# Patient Record
Sex: FEMALE | Race: WHITE | NOT HISPANIC OR LATINO | Employment: FULL TIME | URBAN - NONMETROPOLITAN AREA
[De-identification: names, ages, dates, MRNs, and addresses within clinical notes are randomized per-mention and may not be internally consistent; named-entity substitution may affect disease eponyms.]

---

## 2017-06-02 ENCOUNTER — HOSPITAL ENCOUNTER (EMERGENCY)
Facility: HOSPITAL | Age: 37
Discharge: HOME | End: 2017-06-02
Attending: PHYSICIAN ASSISTANT | Admitting: PHYSICIAN ASSISTANT
Payer: COMMERCIAL

## 2017-06-02 VITALS
HEART RATE: 93 BPM | OXYGEN SATURATION: 99 % | TEMPERATURE: 98.2 F | RESPIRATION RATE: 16 BRPM | DIASTOLIC BLOOD PRESSURE: 58 MMHG | SYSTOLIC BLOOD PRESSURE: 125 MMHG

## 2017-06-02 DIAGNOSIS — R22.0 FACIAL SWELLING: ICD-10-CM
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
## 2017-06-02 PROCEDURE — 99214 OFFICE O/P EST MOD 30 MIN: CPT | Mod: 25

## 2017-06-02 PROCEDURE — 99213 OFFICE O/P EST LOW 20 MIN: CPT | Performed by: PHYSICIAN ASSISTANT

## 2017-06-02 PROCEDURE — 25000132 ZZH RX MED GY IP 250 OP 250 PS 637: Performed by: PHYSICIAN ASSISTANT

## 2017-06-02 PROCEDURE — 25000128 H RX IP 250 OP 636: Performed by: PHYSICIAN ASSISTANT

## 2017-06-02 PROCEDURE — 96372 THER/PROPH/DIAG INJ SC/IM: CPT

## 2017-06-02 PROCEDURE — 96374 THER/PROPH/DIAG INJ IV PUSH: CPT

## 2017-06-02 RX ORDER — METHYLPREDNISOLONE SODIUM SUCCINATE 125 MG/2ML
125 INJECTION, POWDER, LYOPHILIZED, FOR SOLUTION INTRAMUSCULAR; INTRAVENOUS ONCE
Status: COMPLETED | OUTPATIENT
Start: 2017-06-02 | End: 2017-06-02

## 2017-06-02 RX ORDER — CETIRIZINE HYDROCHLORIDE 10 MG/1
10 TABLET ORAL ONCE
Status: COMPLETED | OUTPATIENT
Start: 2017-06-02 | End: 2017-06-02

## 2017-06-02 RX ADMIN — RANITIDINE HYDROCHLORIDE 300 MG: 150 TABLET, FILM COATED ORAL at 15:29

## 2017-06-02 RX ADMIN — METHYLPREDNISOLONE SODIUM SUCCINATE 125 MG: 125 INJECTION, POWDER, FOR SOLUTION INTRAMUSCULAR; INTRAVENOUS at 15:36

## 2017-06-02 RX ADMIN — CETIRIZINE HYDROCHLORIDE 10 MG: 10 TABLET, FILM COATED ORAL at 15:29

## 2017-06-02 ASSESSMENT — ENCOUNTER SYMPTOMS
HEADACHES: 0
RESPIRATORY NEGATIVE: 1
VOMITING: 0
WHEEZING: 0
FEVER: 0
NAUSEA: 0
TROUBLE SWALLOWING: 0
EYES NEGATIVE: 1
STRIDOR: 0
CHOKING: 0
NUMBNESS: 1
CONSTITUTIONAL NEGATIVE: 1
CHILLS: 0
CARDIOVASCULAR NEGATIVE: 1
COUGH: 0
SHORTNESS OF BREATH: 0
MUSCULOSKELETAL NEGATIVE: 1
LIGHT-HEADEDNESS: 0
SORE THROAT: 0
FACIAL SWELLING: 1

## 2017-06-02 NOTE — ED NOTES
Ambulated into UC. With facial pain. Rating 0 out of 10. Pt. Was bit by what she believes was a deer fly on the forehead at 10:30 this morning.  Face was swelling and red in character. Benadryl  tabs 25mg administered at 1345. States medication was not helpful and feels her face has increased swelling.

## 2017-06-02 NOTE — DISCHARGE INSTRUCTIONS
- 20mg zyrtec (double dose of zyrtec/allegra/claritin) 2x daily for 2-3 days.   - If none of the above and benadryl doesn't make you too tired, you can take 50 mg every 4-6 hrs for 2 days.     * Recheck here after 48-72 hrs with any concern for infection. Back here sooner with any facial swelling of the lips/tongue/uvula - 911 with trouble breathing.

## 2017-06-02 NOTE — ED PROVIDER NOTES
History     Chief Complaint   Patient presents with     Facial Swelling     The history is provided by the patient. No  was used.     Riri Oro is a 37 year old female who presents with facial swelling after horsefly? Bite earlier today. She has taken 50 mg of benadryl about 3 hrs ago and that has helped with itching. NO lip, tongue or throat swelling. She reports her throat DOES feel scratchy. NO shortness of breath.     I have reviewed the Medications, Allergies, Past Medical and Surgical History, and Social History in the Epic system.    Review of Systems   Constitutional: Negative.  Negative for chills and fever.   HENT: Positive for facial swelling. Negative for sore throat and trouble swallowing.    Eyes: Negative.    Respiratory: Negative.  Negative for cough, choking, shortness of breath, wheezing and stridor.    Cardiovascular: Negative.    Gastrointestinal: Negative for nausea and vomiting.   Musculoskeletal: Negative.    Skin: Positive for rash.   Neurological: Positive for numbness (tingling around site). Negative for syncope, light-headedness and headaches.       Physical Exam   BP: 125/58  Pulse: 93  Temp: 98.2  F (36.8  C)  Resp: 16  SpO2: 99 %  Physical Exam   Constitutional: She is oriented to person, place, and time. She appears well-developed and well-nourished. No distress.   HENT:   Head:       NO angioedema.   Cardiovascular: Normal rate.    Pulmonary/Chest: Effort normal.   Neurological: She is alert and oriented to person, place, and time.   Skin: Skin is warm and dry.   Psychiatric: She has a normal mood and affect.   Nursing note and vitals reviewed.      ED Course     ED Course     Procedures  Medications   methylPREDNISolone sodium succinate (solu-MEDROL) injection 125 mg (125 mg Intravenous Given 6/2/17 1536)   ranitidine (ZANTAC) tablet 300 mg (300 mg Oral Given 6/2/17 1529)   cetirizine (zyrTEC) tablet 10 mg (10 mg Oral Given 6/2/17 1529)   Patient  tolerated. Patient observed for 20 minutes.       Assessments & Plan (with Medical Decision Making)     I have reviewed the nursing notes.    I have reviewed the findings, diagnosis, plan and need for follow up with the patient.    There are no discharge medications for this patient.      Final diagnoses:   Insect bite, initial encounter   Facial swelling   Pt treated as above. Will take zyrtec BID for 3-5 days. Back here with tongue/lip swelling, 911 with trouble breathing. Patient verbally educated and given appropriate education sheets for each of the diagnoses and has no questions.    Venkat Santos PA-C   6/2/2017   6:08 PM    6/2/2017   [facility]     Venkat Satnos PA  06/02/17 1800

## 2017-06-02 NOTE — ED AVS SNAPSHOT
[facility]    [address] [address]    Phone:  [phone]                                       Riri Oro   MRN: [id number]    Department:  [facility]   Date of Visit:  6/2/2017           After Visit Summary Signature Page     I have received my discharge instructions, and my questions have been answered. I have discussed any challenges I see with this plan with the nurse or doctor.    ..........................................................................................................................................  Patient/Patient Representative Signature      ..........................................................................................................................................  Patient Representative Print Name and Relationship to Patient    ..................................................               ................................................  Date                                            Time    ..........................................................................................................................................  Reviewed by Signature/Title    ...................................................              ..............................................  Date                                                            Time

## 2017-06-02 NOTE — ED AVS SNAPSHOT
" [facility]    [address] [place] [address]    Phone:  [phone]                                       Riri Oro   MRN: [id number]    Department:  [facility]   Date of Visit:  6/2/2017           Patient Information     Date Of Birth          1980        Your diagnoses for this visit were:     Insect bite, initial encounter     Facial swelling        You were seen by Venkat Santos PA.        Discharge Instructions       - 20mg zyrtec (double dose of zyrtec/allegra/claritin) 2x daily for 2-3 days.   - If none of the above and benadryl doesn't make you too tired, you can take 50 mg every 4-6 hrs for 2 days.     * Recheck here after 48-72 hrs with any concern for infection. Back here sooner with any facial swelling of the lips/tongue/uvula - 911 with trouble breathing.     Discharge References/Attachments     INSECT BITE (ENGLISH)    ANGIOEDEMA (ENGLISH)         Review of your medicines      Notice     You have not been prescribed any medications.            Orders Needing Specimen Collection     None      Pending Results     No orders found from 5/31/2017 to 6/3/2017.            Pending Culture Results     No orders found from 5/31/2017 to 6/3/2017.            Thank you for choosing [facility]       Thank you for choosing [facility] for your care. Our goal is always to provide you with excellent care. Hearing back from our patients is one way we can continue to improve our services. Please take a few minutes to complete the written survey that you may receive in the mail after you visit with us. Thank you!        [employer] Information     [employer] lets you send messages to your doctor, view your test results, renew your prescriptions, schedule appointments and more. To sign up, go to [email or web address] . Click on \"Log in\" on the left side of the screen, which will take you to the Welcome page. Then click on \"Sign up Now\" on the right side of the page.     You " will be asked to enter the access code listed below, as well as some personal information. Please follow the directions to create your username and password.     Your access code is: [email or web address]  Expires: 2017  3:13 PM     Your access code will  in 90 days. If you need help or a new code, please call your [facility] clinic or [phone].        Care EveryWhere ID     This is your Care EveryWhere ID. This could be used by other organizations to access your [facility] records  [id number]        After Visit Summary       This is your record. Keep this with you and show to your community pharmacist(s) and doctor(s) at your next visit.

## 2017-11-26 ENCOUNTER — HEALTH MAINTENANCE LETTER (OUTPATIENT)
Age: 37
End: 2017-11-26

## 2018-01-03 ENCOUNTER — TELEPHONE (OUTPATIENT)
Dept: FAMILY MEDICINE | Facility: OTHER | Age: 38
End: 2018-01-03

## 2018-01-03 NOTE — TELEPHONE ENCOUNTER
Patient called wanting to be seen today but at this time we have no availability. I instructed patient we can not evaluate over the phone, instructed she can go to ER/Urgent care to be evaluated or can call in the morning to see if we have any same day slots

## 2018-02-06 ENCOUNTER — TRANSFERRED RECORDS (OUTPATIENT)
Dept: HEALTH INFORMATION MANAGEMENT | Facility: CLINIC | Age: 38
End: 2018-02-06

## 2018-02-06 LAB
HPV ABSTRACT: NORMAL
PAP-ABSTRACT: NORMAL

## 2019-05-07 ENCOUNTER — HOSPITAL ENCOUNTER (EMERGENCY)
Facility: HOSPITAL | Age: 39
Discharge: HOME | End: 2019-05-07
Attending: NURSE PRACTITIONER | Admitting: NURSE PRACTITIONER
Payer: COMMERCIAL

## 2019-05-07 ENCOUNTER — APPOINTMENT (OUTPATIENT)
Dept: GENERAL RADIOLOGY | Facility: HOSPITAL | Age: 39
End: 2019-05-07
Attending: NURSE PRACTITIONER
Payer: COMMERCIAL

## 2019-05-07 VITALS
OXYGEN SATURATION: 100 % | DIASTOLIC BLOOD PRESSURE: 68 MMHG | TEMPERATURE: 98.7 F | BODY MASS INDEX: 33.66 KG/M2 | SYSTOLIC BLOOD PRESSURE: 125 MMHG | HEIGHT: 63 IN | HEART RATE: 92 BPM | WEIGHT: 190 LBS | RESPIRATION RATE: 20 BRPM

## 2019-05-07 DIAGNOSIS — R05.9 COUGH: ICD-10-CM

## 2019-05-07 DIAGNOSIS — K64.4 EXTERNAL HEMORRHOIDS: ICD-10-CM

## 2019-05-07 DIAGNOSIS — D64.9 ANEMIA, UNSPECIFIED TYPE: ICD-10-CM

## 2019-05-07 LAB
ANION GAP SERPL CALCULATED.3IONS-SCNC: 4 MMOL/L (ref 3–14)
BASOPHILS # BLD AUTO: 0 10E9/L (ref 0–0.2)
BASOPHILS NFR BLD AUTO: 0.4 %
BUN SERPL-MCNC: 12 MG/DL (ref 7–30)
CALCIUM SERPL-MCNC: 8.5 MG/DL (ref 8.5–10.1)
CHLORIDE SERPL-SCNC: 108 MMOL/L (ref 94–109)
CO2 SERPL-SCNC: 27 MMOL/L (ref 20–32)
CREAT SERPL-MCNC: 0.82 MG/DL (ref 0.52–1.04)
DIFFERENTIAL METHOD BLD: ABNORMAL
EOSINOPHIL # BLD AUTO: 0.2 10E9/L (ref 0–0.7)
EOSINOPHIL NFR BLD AUTO: 1.6 %
ERYTHROCYTE [DISTWIDTH] IN BLOOD BY AUTOMATED COUNT: 15.5 % (ref 10–15)
GFR SERPL CREATININE-BSD FRML MDRD: 89 ML/MIN/{1.73_M2}
GLUCOSE SERPL-MCNC: 92 MG/DL (ref 70–99)
HCT VFR BLD AUTO: 26.1 % (ref 35–47)
HGB BLD-MCNC: 7.3 G/DL (ref 11.7–15.7)
HGB BLD-MCNC: 7.5 G/DL (ref 11.7–15.7)
IMM GRANULOCYTES # BLD: 0.1 10E9/L (ref 0–0.4)
IMM GRANULOCYTES NFR BLD: 0.9 %
LYMPHOCYTES # BLD AUTO: 1.8 10E9/L (ref 0.8–5.3)
LYMPHOCYTES NFR BLD AUTO: 18.5 %
MCH RBC QN AUTO: 20.7 PG (ref 26.5–33)
MCHC RBC AUTO-ENTMCNC: 28.7 G/DL (ref 31.5–36.5)
MCV RBC AUTO: 72 FL (ref 78–100)
MONOCYTES # BLD AUTO: 0.6 10E9/L (ref 0–1.3)
MONOCYTES NFR BLD AUTO: 6.7 %
NEUTROPHILS # BLD AUTO: 6.9 10E9/L (ref 1.6–8.3)
NEUTROPHILS NFR BLD AUTO: 71.9 %
NRBC # BLD AUTO: 0 10*3/UL
NRBC BLD AUTO-RTO: 0 /100
PLATELET # BLD AUTO: 419 10E9/L (ref 150–450)
POTASSIUM SERPL-SCNC: 3.7 MMOL/L (ref 3.4–5.3)
RBC # BLD AUTO: 3.63 10E12/L (ref 3.8–5.2)
SODIUM SERPL-SCNC: 139 MMOL/L (ref 133–144)
TROPONIN I SERPL-MCNC: <0.015 UG/L (ref 0–0.04)
WBC # BLD AUTO: 9.6 10E9/L (ref 4–11)

## 2019-05-07 PROCEDURE — G0463 HOSPITAL OUTPT CLINIC VISIT: HCPCS | Mod: 25

## 2019-05-07 PROCEDURE — 71046 X-RAY EXAM CHEST 2 VIEWS: CPT | Mod: TC

## 2019-05-07 PROCEDURE — 93010 ELECTROCARDIOGRAM REPORT: CPT | Performed by: INTERNAL MEDICINE

## 2019-05-07 PROCEDURE — 93005 ELECTROCARDIOGRAM TRACING: CPT

## 2019-05-07 PROCEDURE — 99215 OFFICE O/P EST HI 40 MIN: CPT | Mod: Z6 | Performed by: NURSE PRACTITIONER

## 2019-05-07 PROCEDURE — 36415 COLL VENOUS BLD VENIPUNCTURE: CPT | Performed by: NURSE PRACTITIONER

## 2019-05-07 PROCEDURE — 85018 HEMOGLOBIN: CPT | Performed by: NURSE PRACTITIONER

## 2019-05-07 PROCEDURE — 85025 COMPLETE CBC W/AUTO DIFF WBC: CPT | Performed by: NURSE PRACTITIONER

## 2019-05-07 PROCEDURE — 84484 ASSAY OF TROPONIN QUANT: CPT | Performed by: NURSE PRACTITIONER

## 2019-05-07 PROCEDURE — 80048 BASIC METABOLIC PNL TOTAL CA: CPT | Performed by: NURSE PRACTITIONER

## 2019-05-07 RX ORDER — FERROUS SULFATE 325(65) MG
325 TABLET ORAL 3 TIMES DAILY
Qty: 90 TABLET | Refills: 0 | Status: SHIPPED | OUTPATIENT
Start: 2019-05-07 | End: 2021-05-24

## 2019-05-07 ASSESSMENT — ENCOUNTER SYMPTOMS
RECTAL PAIN: 1
CHEST TIGHTNESS: 1
ANAL BLEEDING: 1
VOMITING: 0
COUGH: 1
ABDOMINAL PAIN: 0

## 2019-05-07 ASSESSMENT — MIFFLIN-ST. JEOR: SCORE: 1495.96

## 2019-05-07 NOTE — ED PROVIDER NOTES
History     Chief Complaint   Patient presents with     Cough     X 2 months     Nasal Congestion     X 2 days     HPI  Riri Oro is a 39 year old female who presents today with a CC of cough x 2 months, nasal congestion x 3 days.  No measured fevers.  She has been using cough drops off and on over the past 2 months.  Increased shortness of breath with going up and down stairs, fatigue and chest pressure x 2 months.  She denies chest pain.  She notes a history of intermittent dizziness while walking off and on x 2 years.  She states she had a work up in the past with EKG, lab work, they didn't find anything.  Symptoms seemed to resolve for some time, now it they seem to have increased again.      Allergies:  Allergies   Allergen Reactions     Bee Venom Anaphylaxis     Amoxicillin Hives       Problem List:    Patient Active Problem List    Diagnosis Date Noted     Spondylolisthesis of lumbar region      Priority: Medium     Grade 2/4       Obesity      Priority: Medium     Tobacco use disorder 2013     Priority: Medium        Past Medical History:    Past Medical History:   Diagnosis Date     Obesity      Spondylolisthesis of lumbar region      Tobacco use disorder 2013       Past Surgical History:    Past Surgical History:   Procedure Laterality Date     GYN SURGERY           HC HYSTEROS W PERMANENT FALLOPAIN IMPLANT         Family History:    Family History   Problem Relation Age of Onset     Heart Disease Father 53        MI and bypass surgery     Diabetes Father      Hypertension Father        Social History:  Marital Status:  Legally  [3]  Social History     Tobacco Use     Smoking status: Current Every Day Smoker     Packs/day: 0.50     Smokeless tobacco: Never Used   Substance Use Topics     Alcohol use: No     Drug use: No        Medications:      ferrous sulfate (FEROSUL) 325 (65 Fe) MG tablet         Review of Systems   Constitutional: Positive for fatigue. Negative for  "appetite change, chills and fever.   HENT: Positive for congestion. Negative for ear pain, postnasal drip, sinus pressure, sinus pain and sore throat.    Respiratory: Positive for cough, chest tightness and shortness of breath (with walking up and down stairs).    Cardiovascular: Positive for leg swelling (intermittent, dependent at the end of the evening). Negative for chest pain.   Gastrointestinal: Positive for anal bleeding (external hemorrhoids) and rectal pain. Negative for abdominal distention, abdominal pain and vomiting.        Denies black or tarry stools, reports times with duc blood in toilet from external hemorrhoids after stooling   Genitourinary: Negative for dysuria, hematuria, menstrual problem, pelvic pain and urgency.   Musculoskeletal: Negative for arthralgias and myalgias.   Skin: Negative for color change, pallor and rash.   Neurological: Positive for light-headedness (as per HPI). Negative for syncope.   Psychiatric/Behavioral: The patient is nervous/anxious (at times).        Physical Exam   BP: 125/68  Pulse: 92  Temp: 98.7  F (37.1  C)  Resp: 20  Height: 160 cm (5' 3\")  Weight: 86.2 kg (190 lb)  SpO2: 100 %      Physical Exam   Constitutional: She appears well-developed. She is cooperative. She does not appear ill.   HENT:   Head: Normocephalic and atraumatic.   Right Ear: Tympanic membrane, external ear and ear canal normal.   Left Ear: Tympanic membrane, external ear and ear canal normal.   Nose: Nose normal.   Mouth/Throat: Uvula is midline and oropharynx is clear and moist.   Neck: Normal range of motion. Neck supple.   Cardiovascular: Normal rate and regular rhythm.   Pulmonary/Chest: Effort normal and breath sounds normal.   Abdominal: Soft. Bowel sounds are normal. She exhibits no distension and no mass. There is no tenderness. There is no guarding.   Genitourinary: Rectal exam shows external hemorrhoid (several large, no active bleeding, no sign of thrombosis).   Musculoskeletal: " Normal range of motion.   Neurological: She is alert.   Skin: Skin is warm and dry.   Psychiatric: She has a normal mood and affect. Her behavior is normal.   Nursing note and vitals reviewed.      ED Course        Procedures    Results for orders placed or performed during the hospital encounter of 05/07/19   Chest XR,  PA & LAT    Narrative    PROCEDURE:  XR CHEST 2 VW    HISTORY:  cough, chest tightness.     COMPARISON:  None.    FINDINGS:   The cardiac silhouette is normal in size. The pulmonary vasculature is  normal.  The lungs are clear. No pleural effusion or pneumothorax.      Impression    IMPRESSION:  No acute cardiopulmonary disease.      JAYSON REYES MD   Troponin I   Result Value Ref Range    Troponin I ES <0.015 0.000 - 0.045 ug/L   CBC with platelets differential   Result Value Ref Range    WBC 9.6 4.0 - 11.0 10e9/L    RBC Count 3.63 (L) 3.8 - 5.2 10e12/L    Hemoglobin 7.5 (L) 11.7 - 15.7 g/dL    Hematocrit 26.1 (L) 35.0 - 47.0 %    MCV 72 (L) 78 - 100 fl    MCH 20.7 (L) 26.5 - 33.0 pg    MCHC 28.7 (L) 31.5 - 36.5 g/dL    RDW 15.5 (H) 10.0 - 15.0 %    Platelet Count 419 150 - 450 10e9/L    Diff Method Automated Method     % Neutrophils 71.9 %    % Lymphocytes 18.5 %    % Monocytes 6.7 %    % Eosinophils 1.6 %    % Basophils 0.4 %    % Immature Granulocytes 0.9 %    Nucleated RBCs 0 0 /100    Absolute Neutrophil 6.9 1.6 - 8.3 10e9/L    Absolute Lymphocytes 1.8 0.8 - 5.3 10e9/L    Absolute Monocytes 0.6 0.0 - 1.3 10e9/L    Absolute Eosinophils 0.2 0.0 - 0.7 10e9/L    Absolute Basophils 0.0 0.0 - 0.2 10e9/L    Abs Immature Granulocytes 0.1 0 - 0.4 10e9/L    Absolute Nucleated RBC 0.0    Basic metabolic panel   Result Value Ref Range    Sodium 139 133 - 144 mmol/L    Potassium 3.7 3.4 - 5.3 mmol/L    Chloride 108 94 - 109 mmol/L    Carbon Dioxide 27 20 - 32 mmol/L    Anion Gap 4 3 - 14 mmol/L    Glucose 92 70 - 99 mg/dL    Urea Nitrogen 12 7 - 30 mg/dL    Creatinine 0.82 0.52 - 1.04 mg/dL    GFR  Estimate 89 >60 mL/min/[1.73_m2]    GFR Estimate If Black >90 >60 mL/min/[1.73_m2]    Calcium 8.5 8.5 - 10.1 mg/dL   Hemoglobin   Result Value Ref Range    Hemoglobin 7.3 (L) 11.7 - 15.7 g/dL          EKG Interpretation:      EKG Number: 1  Interpreted by Emily Pinto  Symptoms at time of EKG: shortness of breath on exertion   Rhythm: normal sinus   Rate: normal  Axis: NORMAL  Ectopy: none  Conduction: normal  ST Segments/ T Waves: No ST-T wave changes  Q Waves: none  Comparison to prior: No old EKG available    Clinical Impression: normal EKG    Assessments & Plan (with Medical Decision Making)     I have reviewed the nursing notes.    I have reviewed the findings, diagnosis, plan and need for follow up with the patient.  Consulted with Dr Jak Huerta, ED Physician who recommended that I consult with Internal Medicine on call    Consulted with Dr Grande, on call Internal Medicine Physician.  He feels that this is a chronic condition, patient is stable, no active bleeding, no indication for blood transfusion, admission or urgent Surgical Consultation at this time.  Start patient on IV or oral Iron supplementation and have her follow up with PCP this week.      ASSESSMENT / PLAN:  (D64.9) Anemia, unspecified type  Plan: GENERAL SURG ADULT REFERRAL  Ferrous Sulfate   Miralax or stool softener to prevent constipation  Call tomorrow to schedule appointment with PCP    (R05) Cough  Comment: mild, lungs CTA, chest x-ray negative for acute process  Plan:  Symptomatic treatments, may see improvement with improvement in hemoglobin    (K64.4) External hemorrhoids  Comment: no active bleeding at this time but patient reports recent duc blood  Plan: GENERAL SURG ADULT REFERRAL           Medication List      Started    ferrous sulfate 325 (65 Fe) MG tablet  Commonly known as:  FEROSUL  325 mg, Oral, 3 TIMES DAILY            Final diagnoses:   Anemia, unspecified type   Cough   External hemorrhoids       5/7/2019   [facility]     Emily Pinto NP  05/08/19 [id number]

## 2019-05-07 NOTE — ED AVS SNAPSHOT
[facility]  [address] [address]  Phone:  [phone]                                    Riri Oro   MRN: [id number]    Department:  [facility]   Date of Visit:  5/7/2019           After Visit Summary Signature Page    I have received my discharge instructions, and my questions have been answered. I have discussed any challenges I see with this plan with the nurse or doctor.    ..........................................................................................................................................  Patient/Patient Representative Signature      ..........................................................................................................................................  Patient Representative Print Name and Relationship to Patient    ..................................................               ................................................  Date                                   Time    ..........................................................................................................................................  Reviewed by Signature/Title    ...................................................              ..............................................  Date                                               Time          22EPIC Rev 08/18

## 2019-05-08 ENCOUNTER — OFFICE VISIT (OUTPATIENT)
Dept: FAMILY MEDICINE | Facility: OTHER | Age: 39
End: 2019-05-08
Attending: NURSE PRACTITIONER
Payer: COMMERCIAL

## 2019-05-08 ENCOUNTER — TELEPHONE (OUTPATIENT)
Dept: FAMILY MEDICINE | Facility: OTHER | Age: 39
End: 2019-05-08

## 2019-05-08 VITALS
DIASTOLIC BLOOD PRESSURE: 72 MMHG | HEART RATE: 81 BPM | TEMPERATURE: 98.8 F | SYSTOLIC BLOOD PRESSURE: 107 MMHG | OXYGEN SATURATION: 98 % | WEIGHT: 195 LBS | BODY MASS INDEX: 34.54 KG/M2

## 2019-05-08 DIAGNOSIS — R05.9 COUGH: ICD-10-CM

## 2019-05-08 DIAGNOSIS — D64.9 ANEMIA, UNSPECIFIED TYPE: Primary | ICD-10-CM

## 2019-05-08 DIAGNOSIS — K64.4 EXTERNAL HEMORRHOIDS: ICD-10-CM

## 2019-05-08 LAB
ALBUMIN UR-MCNC: NEGATIVE MG/DL
ANISOCYTOSIS BLD QL SMEAR: SLIGHT
APPEARANCE UR: CLEAR
BACTERIA #/AREA URNS HPF: ABNORMAL /HPF
BASOPHILS # BLD AUTO: 0 10E9/L (ref 0–0.2)
BASOPHILS NFR BLD AUTO: 0.5 %
BILIRUB UR QL STRIP: NEGATIVE
COLOR UR AUTO: ABNORMAL
COPATH REPORT: NORMAL
DIFFERENTIAL METHOD BLD: ABNORMAL
ELLIPTOCYTES BLD QL SMEAR: SLIGHT
EOSINOPHIL # BLD AUTO: 0.2 10E9/L (ref 0–0.7)
EOSINOPHIL NFR BLD AUTO: 2.4 %
ERYTHROCYTE [DISTWIDTH] IN BLOOD BY AUTOMATED COUNT: 15.5 % (ref 10–15)
FERRITIN SERPL-MCNC: 2 NG/ML (ref 12–150)
FOLATE SERPL-MCNC: 11.4 NG/ML
GLUCOSE UR STRIP-MCNC: NEGATIVE MG/DL
HCT VFR BLD AUTO: 26.4 % (ref 35–47)
HGB BLD-MCNC: 7.6 G/DL (ref 11.7–15.7)
HGB UR QL STRIP: ABNORMAL
IMM GRANULOCYTES # BLD: 0.1 10E9/L (ref 0–0.4)
IMM GRANULOCYTES NFR BLD: 1.4 %
IRON SATN MFR SERPL: 5 % (ref 15–46)
IRON SERPL-MCNC: 20 UG/DL (ref 35–180)
KETONES UR STRIP-MCNC: NEGATIVE MG/DL
LEUKOCYTE ESTERASE UR QL STRIP: NEGATIVE
LYMPHOCYTES # BLD AUTO: 1.8 10E9/L (ref 0.8–5.3)
LYMPHOCYTES NFR BLD AUTO: 19.9 %
MCH RBC QN AUTO: 20.6 PG (ref 26.5–33)
MCHC RBC AUTO-ENTMCNC: 28.8 G/DL (ref 31.5–36.5)
MCV RBC AUTO: 72 FL (ref 78–100)
MICROCYTES BLD QL SMEAR: PRESENT
MONOCYTES # BLD AUTO: 0.6 10E9/L (ref 0–1.3)
MONOCYTES NFR BLD AUTO: 6.5 %
MUCOUS THREADS #/AREA URNS LPF: PRESENT /LPF
NEUTROPHILS # BLD AUTO: 6.2 10E9/L (ref 1.6–8.3)
NEUTROPHILS NFR BLD AUTO: 69.3 %
NITRATE UR QL: NEGATIVE
NRBC # BLD AUTO: 0 10*3/UL
NRBC BLD AUTO-RTO: 0 /100
PH UR STRIP: 6.5 PH (ref 4.7–8)
PLATELET # BLD AUTO: 445 10E9/L (ref 150–450)
PLATELET # BLD EST: ABNORMAL 10*3/UL
POIKILOCYTOSIS BLD QL SMEAR: SLIGHT
POLYCHROMASIA BLD QL SMEAR: SLIGHT
RBC # BLD AUTO: 3.69 10E12/L (ref 3.8–5.2)
RBC #/AREA URNS AUTO: <1 /HPF (ref 0–2)
RBC MORPH BLD: ABNORMAL
RETICS # AUTO: 94.8 10E9/L (ref 25–95)
RETICS/RBC NFR AUTO: 2.6 % (ref 0.5–2)
SOURCE: ABNORMAL
SP GR UR STRIP: 1.01 (ref 1–1.03)
SQUAMOUS #/AREA URNS AUTO: 3 /HPF (ref 0–1)
TIBC SERPL-MCNC: 397 UG/DL (ref 240–430)
UROBILINOGEN UR STRIP-MCNC: NORMAL MG/DL (ref 0–2)
VIT B12 SERPL-MCNC: 256 PG/ML (ref 193–986)
WBC # BLD AUTO: 8.9 10E9/L (ref 4–11)
WBC #/AREA URNS AUTO: 11 /HPF (ref 0–5)

## 2019-05-08 PROCEDURE — 82746 ASSAY OF FOLIC ACID SERUM: CPT | Mod: 90 | Performed by: NURSE PRACTITIONER

## 2019-05-08 PROCEDURE — 83550 IRON BINDING TEST: CPT | Performed by: NURSE PRACTITIONER

## 2019-05-08 PROCEDURE — 83516 IMMUNOASSAY NONANTIBODY: CPT | Mod: 90 | Performed by: NURSE PRACTITIONER

## 2019-05-08 PROCEDURE — 40000847 ZZHCL STATISTIC MORPHOLOGY W/INTERP HISTOLOGY TC 85060: Performed by: NURSE PRACTITIONER

## 2019-05-08 PROCEDURE — 99214 OFFICE O/P EST MOD 30 MIN: CPT | Performed by: NURSE PRACTITIONER

## 2019-05-08 PROCEDURE — 87086 URINE CULTURE/COLONY COUNT: CPT | Performed by: NURSE PRACTITIONER

## 2019-05-08 PROCEDURE — 81001 URINALYSIS AUTO W/SCOPE: CPT | Performed by: NURSE PRACTITIONER

## 2019-05-08 PROCEDURE — 36415 COLL VENOUS BLD VENIPUNCTURE: CPT | Performed by: NURSE PRACTITIONER

## 2019-05-08 PROCEDURE — 85025 COMPLETE CBC W/AUTO DIFF WBC: CPT | Performed by: NURSE PRACTITIONER

## 2019-05-08 PROCEDURE — 87088 URINE BACTERIA CULTURE: CPT | Performed by: NURSE PRACTITIONER

## 2019-05-08 PROCEDURE — 82607 VITAMIN B-12: CPT | Mod: 90 | Performed by: NURSE PRACTITIONER

## 2019-05-08 PROCEDURE — 83540 ASSAY OF IRON: CPT | Performed by: NURSE PRACTITIONER

## 2019-05-08 PROCEDURE — 82728 ASSAY OF FERRITIN: CPT | Performed by: NURSE PRACTITIONER

## 2019-05-08 PROCEDURE — 99000 SPECIMEN HANDLING OFFICE-LAB: CPT | Performed by: NURSE PRACTITIONER

## 2019-05-08 PROCEDURE — 85045 AUTOMATED RETICULOCYTE COUNT: CPT | Performed by: NURSE PRACTITIONER

## 2019-05-08 ASSESSMENT — ENCOUNTER SYMPTOMS
FEVER: 0
COLOR CHANGE: 0
SINUS PRESSURE: 0
APPETITE CHANGE: 0
SINUS PAIN: 0
MYALGIAS: 0
HEMATURIA: 0
DYSURIA: 0
FATIGUE: 1
SORE THROAT: 0
NERVOUS/ANXIOUS: 1
ARTHRALGIAS: 0
ABDOMINAL DISTENTION: 0
CHILLS: 0
SHORTNESS OF BREATH: 1
LIGHT-HEADEDNESS: 1

## 2019-05-08 ASSESSMENT — PAIN SCALES - GENERAL: PAINLEVEL: NO PAIN (0)

## 2019-05-08 NOTE — TELEPHONE ENCOUNTER
----- Message from Evangelina Ma NP sent at 5/8/2019  1:38 PM CDT -----  Can you call pt and have her take a daily Claritin to see if this helps with her cough. If not, have her f/u with me in 4 weeks.

## 2019-05-08 NOTE — TELEPHONE ENCOUNTER
8:32 AM    Reason for Call: OVERBOOK    Patient is having the following symptoms: Urgent care follow up  Blood levels off    The patient is requesting an appointment for today with Evangelina Ma    Was an appointment offered for this call? No    Preferred method for responding to this message: [phone]    If we cannot reach you directly, may we leave a detailed response at the number you provided  Yes      Jeanie Gabriel

## 2019-05-08 NOTE — NURSING NOTE
"Chief Complaint   Patient presents with     UC Follow-Up       Initial /72 (BP Location: Right arm, Patient Position: Chair, Cuff Size: Adult Large)   Pulse 81   Temp 98.8  F (37.1  C) (Tympanic)   Wt 88.5 kg (195 lb)   SpO2 98%   BMI 34.54 kg/m   Estimated body mass index is 34.54 kg/m  as calculated from the following:    Height as of 5/7/19: 1.6 m (5' 3\").    Weight as of this encounter: 88.5 kg (195 lb).  Medication Reconciliation: complete    CYN CONDON LPN  "

## 2019-05-08 NOTE — PROGRESS NOTES
SUBJECTIVE:   Riri Oro is a 39 year old female who presents to clinic today for the following   health issues:        ED/UC Followup:    Facility:  [facility]   Date of visit: 5/07/2019  Reason for visit: Anemia, cough, hemorrhoids      Patient presented to the ED yesterday with a dry cough times 2 months. A CXR was done and was negative. A CBC was also done and her hgb was 7.3. It was rechecked several hours later and it was 7.6. She was placed on ferrous sulfate TID and set up with surgery. She has an appointment tomorrow  Current Status: patient notes that she continues to have a dry cough, but denies any other URI symptoms such as nasal congestion or ear pain. She denies any hemoptysis. No fevers. No wheezes or shortness of breath. No chest pain. No abdominal pain. No nausea or vomiting. No unintentional weight loss. She does tell me that she has really bad external hemorrhoids that have worsened over the past 2 months. She states that she has been constipated and having a bowel movement really hurts. There is also often a lot of bright red blood in her stool. She states that occasionally, the toilet bowl will be filled with blood. More often, bright red blood is mixed in with her stool. No black or tarry stools. No blood in her urine. Some fatigue, but she is also stressed and only sleeping 4 hours per night. Occasional lightheadedness over the past 2 months, but she has not fallen. No headaches. She typically gets her menses every 28-30 days. She notes that it is heavy times one day that then becomes light. Lasts for about 5 days. She has not yet started the iron. Does not take anything for constipation. Drinks a lot of water and consumes several servings of fruits and veggies daily. Mother also has problems with hemorrhoids and has had to have surgery. No family h/o colon cancer.     Patient son is deploying out, patient would like cleared to leave for [place].        Additional history: as  documented    Reviewed  and updated as needed this visit by clinical staff         Reviewed and updated as needed this visit by Provider         Patient Active Problem List   Diagnosis     Tobacco use disorder     Obesity     Spondylolisthesis of lumbar region     Past Surgical History:   Procedure Laterality Date     GYN SURGERY           HC HYSTEROS W PERMANENT FALLOPAIN IMPLANT         Social History     Tobacco Use     Smoking status: Current Every Day Smoker     Packs/day: 0.50     Smokeless tobacco: Never Used   Substance Use Topics     Alcohol use: No     Family History   Problem Relation Age of Onset     Heart Disease Father 53        MI and bypass surgery     Diabetes Father      Hypertension Father          Current Outpatient Medications   Medication Sig Dispense Refill     ferrous sulfate (FEROSUL) 325 (65 Fe) MG tablet Take 1 tablet (325 mg) by mouth 3 times daily (Patient not taking: Reported on 2019) 90 tablet 0     Allergies   Allergen Reactions     Bee Venom Anaphylaxis     Amoxicillin Hives       ROS:  As noted in the HPI.     OBJECTIVE:     /72 (BP Location: Right arm, Patient Position: Chair, Cuff Size: Adult Large)   Pulse 81   Temp 98.8  F (37.1  C) (Tympanic)   Wt 88.5 kg (195 lb)   SpO2 98%   BMI 34.54 kg/m    Body mass index is 34.54 kg/m .  GENERAL: overweight, alert and no distress  EYES: Eyes grossly normal to inspection, PERRL and conjunctivae and sclerae normal  HENT: ear canals and TM's normal, nose and mouth without ulcers or lesions  RESP: lungs clear to auscultation - no rales, rhonchi or wheezes  CV: regular rate and rhythm, normal S1 S2, no S3 or S4, no murmur  ABDOMEN: soft, nontender, no hepatosplenomegaly, no masses and bowel sounds normal  RECTAL (female): 4 external hemorrhoids.     Diagnostic Test Results:  Results for orders placed or performed in visit on 19 (from the past 24 hour(s))   CBC with platelets differential   Result Value Ref Range     WBC 8.9 4.0 - 11.0 10e9/L    RBC Count 3.69 (L) 3.8 - 5.2 10e12/L    Hemoglobin 7.6 (L) 11.7 - 15.7 g/dL    Hematocrit 26.4 (L) 35.0 - 47.0 %    MCV 72 (L) 78 - 100 fl    MCH 20.6 (L) 26.5 - 33.0 pg    MCHC 28.8 (L) 31.5 - 36.5 g/dL    RDW 15.5 (H) 10.0 - 15.0 %    Platelet Count 445 150 - 450 10e9/L    Diff Method Automated Method     % Neutrophils 69.3 %    % Lymphocytes 19.9 %    % Monocytes 6.5 %    % Eosinophils 2.4 %    % Basophils 0.5 %    % Immature Granulocytes 1.4 %    Nucleated RBCs 0 0 /100    Absolute Neutrophil 6.2 1.6 - 8.3 10e9/L    Absolute Lymphocytes 1.8 0.8 - 5.3 10e9/L    Absolute Monocytes 0.6 0.0 - 1.3 10e9/L    Absolute Eosinophils 0.2 0.0 - 0.7 10e9/L    Absolute Basophils 0.0 0.0 - 0.2 10e9/L    Abs Immature Granulocytes 0.1 0 - 0.4 10e9/L    Absolute Nucleated RBC 0.0     Anisocytosis Slight     Poikilocytosis Slight     Polychromasia Slight     Elliptocytes Slight     Microcytes Present     RBC Morphology Consistent with reported results     Platelet Estimate       Automated count confirmed.  Platelet morphology is normal.   Reticulocyte Count   Result Value Ref Range    % Retic 2.6 (H) 0.5 - 2.0 %    Absolute Retic 94.8 25 - 95 10e9/L   FERRITIN   Result Value Ref Range    Ferritin 2 (L) 12 - 150 ng/mL   IRON AND IRON BINDING CAPACITY   Result Value Ref Range    Iron 20 (L) 35 - 180 ug/dL    Iron Binding Cap 397 240 - 430 ug/dL    Iron Saturation Index 5 (L) 15 - 46 %   UA reflex to Microscopic and Culture - [place]   Result Value Ref Range    Color Urine Light Yellow     Appearance Urine Clear     Glucose Urine Negative NEG^Negative mg/dL    Bilirubin Urine Negative NEG^Negative    Ketones Urine Negative NEG^Negative mg/dL    Specific Gravity Urine 1.014 1.003 - 1.035    Blood Urine Trace (A) NEG^Negative    pH Urine 6.5 4.7 - 8.0 pH    Protein Albumin Urine Negative NEG^Negative mg/dL    Urobilinogen mg/dL Normal 0.0 - 2.0 mg/dL    Nitrite Urine Negative NEG^Negative    Leukocyte  Esterase Urine Negative NEG^Negative    Source Midstream Urine     RBC Urine <1 0 - 2 /HPF    WBC Urine 11 (H) 0 - 5 /HPF    Bacteria Urine Few (A) NEG^Negative /HPF    Squamous Epithelial /HPF Urine 3 (H) 0 - 1 /HPF    Mucous Urine Present (A) NEG^Negative /LPF       ASSESSMENT/PLAN:   (D64.9) Anemia, unspecified type  (primary encounter diagnosis)  (K64.4) External hemorrhoids  Comment: patient has 4 external hemorrhoids that are often bleeding, no other known source of bleeding  Plan: Hgb stable today at 7.6 and she feels well. Occasional lightheadedness, but no syncope, shortness of breath, or palpitations. VSS. Unsure cause of anemia, but most likely hemorrhoids. She meets with general surgery tomorrow. Peripheral smear is also pending. Will notify patient of the results when available and intervene accordingly. If they do not think this is the cause, will further investigate.     (R05) Cough  Comment: CXR negative and WBC normal.  Plan: Most likely r/t allergies. Will have her begin a daily antihistamine and have her return in 4 weeks if it has not resolved.             Evangelina Ma NP  [facility] - [place]    '

## 2019-05-09 ENCOUNTER — OFFICE VISIT (OUTPATIENT)
Dept: SURGERY | Facility: OTHER | Age: 39
End: 2019-05-09
Attending: NURSE PRACTITIONER
Payer: COMMERCIAL

## 2019-05-09 VITALS
SYSTOLIC BLOOD PRESSURE: 122 MMHG | BODY MASS INDEX: 37.92 KG/M2 | WEIGHT: 214 LBS | HEART RATE: 108 BPM | OXYGEN SATURATION: 100 % | TEMPERATURE: 98.7 F | HEIGHT: 63 IN | RESPIRATION RATE: 16 BRPM | DIASTOLIC BLOOD PRESSURE: 68 MMHG

## 2019-05-09 DIAGNOSIS — K60.2 ANAL FISSURE: Primary | ICD-10-CM

## 2019-05-09 DIAGNOSIS — K64.4 EXTERNAL HEMORRHOIDS: ICD-10-CM

## 2019-05-09 DIAGNOSIS — K62.5 RECTAL BLEEDING: ICD-10-CM

## 2019-05-09 DIAGNOSIS — K64.8 INTERNAL HEMORRHOIDS: ICD-10-CM

## 2019-05-09 LAB
BACTERIA SPEC CULT: ABNORMAL
SPECIMEN SOURCE: ABNORMAL

## 2019-05-09 PROCEDURE — 99204 OFFICE O/P NEW MOD 45 MIN: CPT | Mod: 25 | Performed by: SURGERY

## 2019-05-09 PROCEDURE — 46600 DIAGNOSTIC ANOSCOPY SPX: CPT | Performed by: SURGERY

## 2019-05-09 RX ORDER — SODIUM, POTASSIUM,MAG SULFATES 17.5-3.13G
SOLUTION, RECONSTITUTED, ORAL ORAL
Qty: 2 BOTTLE | Refills: 0 | Status: ON HOLD | OUTPATIENT
Start: 2019-05-09 | End: 2019-06-10

## 2019-05-09 RX ORDER — LIDOCAINE HYDROCHLORIDE 20 MG/ML
JELLY TOPICAL 3 TIMES DAILY PRN
Qty: 30 ML | Refills: 1 | Status: SHIPPED | OUTPATIENT
Start: 2019-05-09 | End: 2021-05-24

## 2019-05-09 ASSESSMENT — PAIN SCALES - GENERAL: PAINLEVEL: NO PAIN (0)

## 2019-05-09 ASSESSMENT — MIFFLIN-ST. JEOR: SCORE: 1604.83

## 2019-05-09 NOTE — PATIENT INSTRUCTIONS
"We want your colonoscopy to be as pleasant as possible. Please review the instructions below. If you have any questions, please contact us at any of the following numbers:     [facility] Health Unit Coordinator: [phone]  Clinic Nurse: [phone]  Surgery Education Nurse: [phone]    Date of Procedure: 6/10/19 with Dr. Carmona  Admit time: Hospital Surgery will call you the day before your procedure by 5pm with your arrival time. If your surgery is on Monday, expect a call on Friday.  If you are not contacted by 5 pm you may call admitting at [phone]. After hours or on weekends, call [phone] to postpone.     Call the clinic nurse if you become ill within 1 week of your procedure to reschedule.     7 DAYS BEFORE THE EXAM:   prescriptions at your pharmacy as soon as possible.   Call the Surgery Education Nurse at [phone] and have a medication list ready.   Do not take Aspirin or NSAIDS (Ibuprofen, Celebrex, Naproxen, etc) 7 days before surgery.   Stop taking fiber supplements, vitamins, iron, and herbals. Do not eat any corn, nuts or seeds.      Arrange transportation with a responsible adult or you will be cancelled.     2 DAYS BEFORE THE EXAM:   Low fiber diet. See the list of low fiber foods on page 3 of the \"Split-Dose SuPrep\" packet. Drink 4-6 large glasses of sports today and tomorrow. Avoid red and purple.    1 DAY BEFORE THE EXAM:  No solid food or milk products after 12:01am. Drink only clear liquids all day. See the list of clear liquids on page 2 of the \"Split-Dose SuPrep\" packet. Nothing red or purple. No alcohol.          AT 6:00 PM THE EVENING PRIOR TO PROCEDURE:  Pour one bottle of Suprep liquid into the mixing container. Add cool water to the 16 oz line, mix and drink. Follow with two 16 oz. glasses of water in the next hour. Stay near a toilet.    DAY OF COLONOSCOPY PROCEDURE:          6 HOURS PRIOR TO THE EXAM (set an alarm):  Repeat the previous instructions with the 2nd " bottle of Suprep followed by 2 glasses of water. Continue clear liquids until 3 hours prior to exam. If you must take medication, take it with a sip of water.  Shower. Wear comfortable clothes. No jewelry, make-up, nail polish, hairspray, lotions, or perfumes. [place] in Admitting through the [facility] Entrance.  You must have a responsible adult to drive and stay with you for 4 hours at home or you will be cancelled.     TIPS FOR COLON CLEANSING BEFORE YOUR COLONOSCOPY  To get accurate results from your exam, your colon must be completely empty or you may need to repeat the colon prep and exam. If you followed instructions and your stool is clear or yellow liquid, you are ready. If you are not sure if your colon is clean, please call the clinic nurse.     You may use tucks wipes, hemorrhoid treatments, hydrocortisone cream, or alcohol-free baby wipes to ease anal irritation. You may also use Vaseline to help protect the skin.     You will have loose watery stools and may also have chills. Expect to feel discomfort, bloating and nausea until the stool clears from your colon. Dress for comfort.     If SuPrep is not covered by insurance and you would like an alternate prep, you or your pharmacy may call the nurse to request a new prescription. The dietary instructions are the same for both preps. Take Dulcolax 5mg at bedtime 2 nights before procedure and 3pm day before exam. Drink 1/2 of the the Golytely at 6pm day before exam 1  8 oz glass every 15 minutes. Repeat with 2nd 1/2 of Golytely 6 hours prior to exam.

## 2019-05-09 NOTE — NURSING NOTE
"Chief Complaint   Patient presents with     Consult     Regarding anemia and external hemmorroids per ER and [facility].       Initial /68   Pulse 108   Temp 98.7  F (37.1  C) (Tympanic)   Resp 16   Ht 1.6 m (5' 3\")   Wt 97.1 kg (214 lb)   SpO2 100%   BMI 37.91 kg/m   Estimated body mass index is 37.91 kg/m  as calculated from the following:    Height as of this encounter: 1.6 m (5' 3\").    Weight as of this encounter: 97.1 kg (214 lb).  Medication Reconciliation: complete    Emily Barcenas LPN    "

## 2019-05-09 NOTE — PROGRESS NOTES
[facility] Surgery Consultation    CC:  Anemia/hemorrhoids    HPI:  This 39 year old year old female is seen at the request of Evangelina Ma for evaluation of anemia/hemorrhoids.  The history is obtained from the patient, and reviewing the medical record.  She is good medical historian. She states that she has been having problems with pain with bowel movements and bleeding. She has a history of constipation where she will have a bowel movement every 2-3 days. She feels she has to bear down to have a bowel movement. She has had bright red blood while wiping and in the toilet. She has never tried any over the counter medications for her constipation. She has tried preparation H, tucks wipes when symptomatic. Over the past couple of months the bleeding has become worse along with the pain. She has no denise-anal pruritus, or seepage. She was seen in the ER for a cough where she was found to be anemic. She does endorse shortness of breath which she usually hasn't been. She was sent to surgery for further evaluation.    Past Medical History:   Diagnosis Date     Obesity      Spondylolisthesis of lumbar region     Grade 2/4     Tobacco use disorder 2013       Past Surgical History:   Procedure Laterality Date     GYN SURGERY           HC HYSTEROS W PERMANENT FALLOPAIN IMPLANT         Family History   Problem Relation Age of Onset     Heart Disease Father 53        MI and bypass surgery     Diabetes Father      Hypertension Father        Social History     Tobacco Use     Smoking status: Former Smoker     Packs/day: 0.50     Start date: 2015     Smokeless tobacco: Never Used   Substance Use Topics     Alcohol use: No     Drug use: No       Prior to Admission medications    Medication Sig Start Date End Date Taking? Authorizing Provider   ferrous sulfate (FEROSUL) 325 (65 Fe) MG tablet Take 1 tablet (325 mg) by mouth 3 times daily  Patient not taking: Reported on 2019   Emily Pinto NP  "      Pt denied problems with bleeding or anesthesia  No mood altering drug use.       Allergies   Allergen Reactions     Bee Venom Anaphylaxis     Amoxicillin Hives       REVIEW OF SYSTEMS:  Ten point review of systems negative except those mentioned in the HPI.     The patient denies sleep apnea, latex allergies or MRSA    OBJECTIVE:    /68   Pulse 108   Temp 98.7  F (37.1  C) (Tympanic)   Resp 16   Ht 1.6 m (5' 3\")   Wt 97.1 kg (214 lb)   SpO2 100%   BMI 37.91 kg/m      GENERAL: Generally appears well, in no distress with appropriate affect.  HEENT:   Sclerae anicteric - No cervical, supra/infraclavicular lymphadenopathy  Respiratory:  Lungs clear to ausculation bilaterally with good air excursion  Cardiovascular:  Regular Rate and Rhythm with no murmurs gallops or rubs, normal   Abdomen: soft, non-tender, non-distended  :  Large circumferential external hemorrhoids/skin tags, anoscopy performed with a right posterior anal fissure, mild erythematous and inflamed internal hemorrhoids, no active bleeding or ulcerations  Extremities:  Extremities normal. No deformities, edema, or skin discoloration.  Skin:  no suspicious lesions or rashes  Neurological: grossly intact  Psych:  Alert, oriented, affect appropriate with normal decision making ability.      IMPRESSION:  38 yo female with rectal bleeding  Anal Fissure  External hemorrhoids  Internal hemorrhoids    PLAN:  I discussed that at this time I would recommend a colonoscopy for further evaluation. The indications, risks, benefits and technical aspects of whole colon colonoscopy were outlined with risks including, but not limited to, perforation, bleeding and inability to visualize entire colon.  Management of each was reviewed.  The need of mechanical preparation of the colon was reviewed along with the use of monitored anesthetic care.  The patient's questions were asked and answered.  Scheduled first available date.  For her anal fissure and " hemorrhoids I recommend nifedipine ointment and lidocaine gel. She can use over the counter preparation H, Tucks wipes, sitz baths, stool softeners, increased fiber intake with Metamucil and water.     I discussed that she can travel to see her son be deployed as it appears that this has been ongoing for some time. I did inform her that if she does develop symptoms of anemia or ongoing bleeding she may need evaluation while away. All questions and concerns were addressed with adequate time spent answering all concerns.        Thank you for allowing me to participate in the care of your patient.       Juan Carmona MD    5/9/2019  10:59 AM    cc:  Evangelina Ma

## 2019-05-10 ENCOUNTER — TELEPHONE (OUTPATIENT)
Dept: FAMILY MEDICINE | Facility: OTHER | Age: 39
End: 2019-05-10

## 2019-05-10 DIAGNOSIS — N30.00 ACUTE CYSTITIS WITHOUT HEMATURIA: Primary | ICD-10-CM

## 2019-05-10 LAB
TTG IGA SER-ACNC: 1 U/ML
TTG IGG SER-ACNC: 1 U/ML

## 2019-05-10 RX ORDER — CEPHALEXIN 500 MG/1
500 CAPSULE ORAL 2 TIMES DAILY
Qty: 10 CAPSULE | Refills: 0 | Status: SHIPPED | OUTPATIENT
Start: 2019-05-10 | End: 2019-05-15

## 2019-06-06 ENCOUNTER — ANESTHESIA EVENT (OUTPATIENT)
Dept: SURGERY | Facility: HOSPITAL | Age: 39
End: 2019-06-06
Payer: COMMERCIAL

## 2019-06-06 ASSESSMENT — LIFESTYLE VARIABLES: TOBACCO_USE: 1

## 2019-06-06 NOTE — ANESTHESIA PREPROCEDURE EVALUATION
Anesthesia Pre-Procedure Evaluation    Patient: Riri Oro   MRN: [id number] : 1980          Preoperative Diagnosis: RECTAL BLEEDING    Procedure(s):  COLONOSCOPY    Past Medical History:   Diagnosis Date     Obesity      Spondylolisthesis of lumbar region     Grade 2/4     Tobacco use disorder 2013     Past Surgical History:   Procedure Laterality Date     GYN SURGERY           HC HYSTEROS W PERMANENT FALLOPAIN IMPLANT         Anesthesia Evaluation     . Pt has had prior anesthetic.     No history of anesthetic complications          ROS/MED HX    ENT/Pulmonary:     (+)FAY risk factors (BMI: 37.91) obese, tobacco use, , . .    Neurologic:  - neg neurologic ROS     Cardiovascular:  - neg cardiovascular ROS   (+) ----. : . . . :. . Previous cardiac testing date:results:date: results:ECG reviewed date:2019 results:NSR date: results:          METS/Exercise Tolerance:     Hematologic:  - neg hematologic  ROS       Musculoskeletal:   (+)  other musculoskeletal- spondyloisthesis lumbar      GI/Hepatic:     (+) bowel prep,       Renal/Genitourinary:  - ROS Renal section negative       Endo:     (+) Obesity, .      Psychiatric:  - neg psychiatric ROS       Infectious Disease:  - neg infectious disease ROS       Malignancy:      - no malignancy   Other:    (+) No chance of pregnancy   - neg other ROS                      Physical Exam      Airway   Mallampati: II  TM distance: >3 FB  Neck ROM: full    Dental   (+) chipped    Cardiovascular   Rhythm and rate: regular and normal      Pulmonary    breath sounds clear to auscultation            Lab Results   Component Value Date    WBC 8.9 2019    HGB 7.6 (L) 2019    HCT 26.4 (L) 2019     2019     2019    POTASSIUM 3.7 2019    CHLORIDE 108 2019    CO2 27 2019    BUN 12 2019    CR 0.82 2019    GLC 92 2019    SUSAN 8.5 2019       Preop Vitals  BP Readings from Last 3  "Encounters:   05/09/19 122/68   05/08/19 107/72   05/07/19 125/68    Pulse Readings from Last 3 Encounters:   05/09/19 108   05/08/19 81   05/07/19 92      Resp Readings from Last 3 Encounters:   05/09/19 16   05/07/19 20   06/02/17 16    SpO2 Readings from Last 3 Encounters:   05/09/19 100%   05/08/19 98%   05/07/19 100%      Temp Readings from Last 1 Encounters:   05/09/19 98.7  F (37.1  C) (Tympanic)    Ht Readings from Last 1 Encounters:   05/09/19 1.6 m (5' 3\")      Wt Readings from Last 1 Encounters:   05/09/19 97.1 kg (214 lb)    Estimated body mass index is 37.91 kg/m  as calculated from the following:    Height as of 5/9/19: 1.6 m (5' 3\").    Weight as of 5/9/19: 97.1 kg (214 lb).       Anesthesia Plan      History & Physical Review  History and physical reviewed and following examination; no interval change.    ASA Status:  3 .    NPO Status:  > 8 hours    Plan for MAC with Intravenous and Propofol induction. Maintenance will be Balanced.  Reason for MAC:  Chronic cardiopulmonary disease (G9) and Other - see comments  PONV prophylaxis:  Ondansetron (or other 5HT-3)  Surgeon requests deep sedation. Patient is an ASA 3. Will provide MAC.  HCG Negative      Postoperative Care  Postoperative pain management:  IV analgesics.      Consents  Anesthetic plan, risks, benefits and alternatives discussed with:  Patient..                 RODRIGO Harmon CRNA  "

## 2019-06-10 ENCOUNTER — HOSPITAL ENCOUNTER (OUTPATIENT)
Facility: HOSPITAL | Age: 39
Discharge: HOME | End: 2019-06-10
Attending: SURGERY | Admitting: SURGERY
Payer: COMMERCIAL

## 2019-06-10 ENCOUNTER — APPOINTMENT (OUTPATIENT)
Dept: LAB | Facility: HOSPITAL | Age: 39
End: 2019-06-10
Attending: SURGERY
Payer: COMMERCIAL

## 2019-06-10 ENCOUNTER — ANESTHESIA (OUTPATIENT)
Dept: SURGERY | Facility: HOSPITAL | Age: 39
End: 2019-06-10
Payer: COMMERCIAL

## 2019-06-10 VITALS
HEIGHT: 63 IN | WEIGHT: 209 LBS | BODY MASS INDEX: 37.03 KG/M2 | TEMPERATURE: 97.8 F | OXYGEN SATURATION: 99 % | SYSTOLIC BLOOD PRESSURE: 110 MMHG | DIASTOLIC BLOOD PRESSURE: 59 MMHG | RESPIRATION RATE: 16 BRPM

## 2019-06-10 LAB — HCG SERPL QL: NEGATIVE

## 2019-06-10 PROCEDURE — 37000008 ZZH ANESTHESIA TECHNICAL FEE, 1ST 30 MIN: Performed by: SURGERY

## 2019-06-10 PROCEDURE — 36000050 ZZH SURGERY LEVEL 2 1ST 30 MIN: Performed by: SURGERY

## 2019-06-10 PROCEDURE — 27210794 ZZH OR GENERAL SUPPLY STERILE: Performed by: SURGERY

## 2019-06-10 PROCEDURE — 45385 COLONOSCOPY W/LESION REMOVAL: CPT | Performed by: ANESTHESIOLOGY

## 2019-06-10 PROCEDURE — 84703 CHORIONIC GONADOTROPIN ASSAY: CPT | Performed by: ANESTHESIOLOGY

## 2019-06-10 PROCEDURE — 40000305 ZZH STATISTIC PRE PROC ASSESS I: Performed by: SURGERY

## 2019-06-10 PROCEDURE — 25000125 ZZHC RX 250: Performed by: NURSE ANESTHETIST, CERTIFIED REGISTERED

## 2019-06-10 PROCEDURE — 25800030 ZZH RX IP 258 OP 636: Performed by: NURSE ANESTHETIST, CERTIFIED REGISTERED

## 2019-06-10 PROCEDURE — 36415 COLL VENOUS BLD VENIPUNCTURE: CPT | Performed by: ANESTHESIOLOGY

## 2019-06-10 PROCEDURE — 45385 COLONOSCOPY W/LESION REMOVAL: CPT | Performed by: NURSE ANESTHETIST, CERTIFIED REGISTERED

## 2019-06-10 PROCEDURE — 71000027 ZZH RECOVERY PHASE 2 EACH 15 MINS: Performed by: SURGERY

## 2019-06-10 PROCEDURE — 45380 COLONOSCOPY AND BIOPSY: CPT | Performed by: SURGERY

## 2019-06-10 PROCEDURE — 25000128 H RX IP 250 OP 636: Performed by: NURSE ANESTHETIST, CERTIFIED REGISTERED

## 2019-06-10 PROCEDURE — 88305 TISSUE EXAM BY PATHOLOGIST: CPT | Mod: TC | Performed by: SURGERY

## 2019-06-10 RX ORDER — ONDANSETRON 4 MG/1
4 TABLET, ORALLY DISINTEGRATING ORAL EVERY 30 MIN PRN
Status: DISCONTINUED | OUTPATIENT
Start: 2019-06-10 | End: 2019-06-10 | Stop reason: HOSPADM

## 2019-06-10 RX ORDER — FENTANYL CITRATE 50 UG/ML
25-50 INJECTION, SOLUTION INTRAMUSCULAR; INTRAVENOUS
Status: DISCONTINUED | OUTPATIENT
Start: 2019-06-10 | End: 2019-06-10 | Stop reason: HOSPADM

## 2019-06-10 RX ORDER — NALOXONE HYDROCHLORIDE 0.4 MG/ML
.1-.4 INJECTION, SOLUTION INTRAMUSCULAR; INTRAVENOUS; SUBCUTANEOUS
Status: CANCELLED | OUTPATIENT
Start: 2019-06-10 | End: 2019-06-11

## 2019-06-10 RX ORDER — ALBUTEROL SULFATE 0.83 MG/ML
2.5 SOLUTION RESPIRATORY (INHALATION) EVERY 4 HOURS PRN
Status: DISCONTINUED | OUTPATIENT
Start: 2019-06-10 | End: 2019-06-10 | Stop reason: HOSPADM

## 2019-06-10 RX ORDER — LIDOCAINE HYDROCHLORIDE 20 MG/ML
INJECTION, SOLUTION INFILTRATION; PERINEURAL PRN
Status: DISCONTINUED | OUTPATIENT
Start: 2019-06-10 | End: 2019-06-10

## 2019-06-10 RX ORDER — LIDOCAINE 40 MG/G
CREAM TOPICAL
Status: DISCONTINUED | OUTPATIENT
Start: 2019-06-10 | End: 2019-06-10 | Stop reason: HOSPADM

## 2019-06-10 RX ORDER — MEPERIDINE HYDROCHLORIDE 50 MG/ML
12.5 INJECTION INTRAMUSCULAR; INTRAVENOUS; SUBCUTANEOUS
Status: DISCONTINUED | OUTPATIENT
Start: 2019-06-10 | End: 2019-06-10 | Stop reason: HOSPADM

## 2019-06-10 RX ORDER — SODIUM CHLORIDE, SODIUM LACTATE, POTASSIUM CHLORIDE, CALCIUM CHLORIDE 600; 310; 30; 20 MG/100ML; MG/100ML; MG/100ML; MG/100ML
INJECTION, SOLUTION INTRAVENOUS CONTINUOUS
Status: DISCONTINUED | OUTPATIENT
Start: 2019-06-10 | End: 2019-06-10 | Stop reason: HOSPADM

## 2019-06-10 RX ORDER — PROPOFOL 10 MG/ML
INJECTION, EMULSION INTRAVENOUS PRN
Status: DISCONTINUED | OUTPATIENT
Start: 2019-06-10 | End: 2019-06-10

## 2019-06-10 RX ORDER — ONDANSETRON 2 MG/ML
4 INJECTION INTRAMUSCULAR; INTRAVENOUS EVERY 30 MIN PRN
Status: DISCONTINUED | OUTPATIENT
Start: 2019-06-10 | End: 2019-06-10 | Stop reason: HOSPADM

## 2019-06-10 RX ORDER — FLUMAZENIL 0.1 MG/ML
0.2 INJECTION, SOLUTION INTRAVENOUS
Status: CANCELLED | OUTPATIENT
Start: 2019-06-10 | End: 2019-06-11

## 2019-06-10 RX ORDER — NALOXONE HYDROCHLORIDE 0.4 MG/ML
.1-.4 INJECTION, SOLUTION INTRAMUSCULAR; INTRAVENOUS; SUBCUTANEOUS
Status: DISCONTINUED | OUTPATIENT
Start: 2019-06-10 | End: 2019-06-10 | Stop reason: HOSPADM

## 2019-06-10 RX ORDER — HYDROMORPHONE HYDROCHLORIDE 1 MG/ML
.3-.5 INJECTION, SOLUTION INTRAMUSCULAR; INTRAVENOUS; SUBCUTANEOUS EVERY 10 MIN PRN
Status: DISCONTINUED | OUTPATIENT
Start: 2019-06-10 | End: 2019-06-10 | Stop reason: HOSPADM

## 2019-06-10 RX ADMIN — LIDOCAINE HYDROCHLORIDE 40 MG: 20 INJECTION, SOLUTION INFILTRATION; PERINEURAL at 14:27

## 2019-06-10 RX ADMIN — PROPOFOL 30 MG: 10 INJECTION, EMULSION INTRAVENOUS at 14:32

## 2019-06-10 RX ADMIN — PROPOFOL 50 MG: 10 INJECTION, EMULSION INTRAVENOUS at 14:30

## 2019-06-10 RX ADMIN — PROPOFOL 30 MG: 10 INJECTION, EMULSION INTRAVENOUS at 14:34

## 2019-06-10 RX ADMIN — PROPOFOL 30 MG: 10 INJECTION, EMULSION INTRAVENOUS at 14:38

## 2019-06-10 RX ADMIN — PROPOFOL 50 MG: 10 INJECTION, EMULSION INTRAVENOUS at 14:28

## 2019-06-10 RX ADMIN — PROPOFOL 20 MG: 10 INJECTION, EMULSION INTRAVENOUS at 14:43

## 2019-06-10 RX ADMIN — SODIUM CHLORIDE, POTASSIUM CHLORIDE, SODIUM LACTATE AND CALCIUM CHLORIDE: 600; 310; 30; 20 INJECTION, SOLUTION INTRAVENOUS at 13:39

## 2019-06-10 RX ADMIN — PROPOFOL 30 MG: 10 INJECTION, EMULSION INTRAVENOUS at 14:41

## 2019-06-10 RX ADMIN — PROPOFOL 80 MG: 10 INJECTION, EMULSION INTRAVENOUS at 14:27

## 2019-06-10 ASSESSMENT — MIFFLIN-ST. JEOR: SCORE: 1582.15

## 2019-06-10 NOTE — ANESTHESIA CARE TRANSFER NOTE
Patient: Riri Oro    Procedure(s):  COLONOSCOPY WITH POLYPECTOMY    Diagnosis: RECTAL BLEEDING  Diagnosis Additional Information: No value filed.    Anesthesia Type:   MAC     Note:  Airway :Nasal Cannula  Patient transferred to:Phase II  Handoff Report: Identifed the Patient, Identified the Reponsible Provider, Reviewed the pertinent medical history, Discussed the surgical course, Reviewed Intra-OP anesthesia mangement and issues during anesthesia, Set expectations for post-procedure period and Allowed opportunity for questions and acknowledgement of understanding      Vitals: (Last set prior to Anesthesia Care Transfer)    CRNA VITALS  6/10/2019 1419 - 6/10/2019 1449      6/10/2019             SpO2:  81 %  (Abnormal)                 Electronically Signed By: RODRIGO Morrison CRNA  Deysi 10, 2019  2:49 PM

## 2019-06-10 NOTE — DISCHARGE INSTRUCTIONS

## 2019-06-10 NOTE — OR NURSING
Patient and responsible adult given discharge instructions with no questions regarding instructions. Tarun score 20. Pain level 0/10.  Discharged from unit via ambulatory. Patient discharged to home.

## 2019-06-10 NOTE — OP NOTE
Riri Oro MRN# [id number]   YOB: 1980 Age: 39 year old      Date of Admission:  6/10/2019    Primary care provider: Evangelina Ma    PREOPERATIVE DIAGNOSIS:  Anemia/hemorrhoids         POSTOPERATIVE DIAGNOSIS:  Mixed internal and external hemorrhoids, sigmoid colon polyp.          PROCEDURE:  Whole colon colonoscopy with cold forceps biopsy.         INDICATIONS:  This 39 year old female presents for colonoscopy due to anemia and hemorrhoids.    OPERATIVE FINDINGS:  There was a single polyp in the sigmoid. Within the anus there were large mixed internal and external hemorrhoids.      DESCRIPTION OF PROCEDURE:  With the patient in the supine position on the transport cart, IV sedation was administered by the nurse anesthetist.  Her correct identity and planned procedure were confirmed during the requisite timeout pause and he was rolled to the left lateral position.  The anus was digitally dilated. there was large external hemorrhoids with no signs of thrombosed hemorrhoids or bleeding. The fiberoptic colonoscope was introduced and negotiated through the length of the colon to the cecal base.  The cecum was intubated and its landmarks clearly identified.  A circumferential examination of the mucosa on introduction of the colonoscope and on its slow withdrawal confirmed the absence of neoplasia, inflammation and/or stricture.  There were minimal diverticuli noted. In the sigmoid there was a small polyp that was identified and removed with cold forceps biopsy. The are was inspected and hemostasis was maintained.  Retroflex in the rectal ampulla showed enlarge internal hemorrhoids with no evidence of bleeding.  Air was aspirated and the colonoscope was withdrawn; the patient was returned to day surgery in good condition, without suggestion of complication and with our invitation to return in 10 years for followup screening examination.   The post surgical debriefing was held and acknowledged at  completion.          Juan Carmona MD     6/10/2019 2:51 PM

## 2019-06-10 NOTE — ANESTHESIA POSTPROCEDURE EVALUATION
Patient: Riri Oro    Procedure(s):  COLONOSCOPY WITH POLYPECTOMY    Diagnosis:RECTAL BLEEDING  Diagnosis Additional Information: No value filed.    Anesthesia Type:  MAC    Note:  Anesthesia Post Evaluation    Patient location during evaluation: Phase 2 and Bedside  Patient participation: Able to fully participate in evaluation  Level of consciousness: awake and alert  Pain management: adequate  Airway patency: patent  Cardiovascular status: acceptable  Respiratory status: acceptable  Hydration status: stable  PONV: none     Anesthetic complications: None          Last vitals:  Vitals:    06/10/19 1456 06/10/19 1500 06/10/19 1505   BP:  103/67 99/51   Resp:      Temp:      SpO2: 100% 100% 98%         Electronically Signed By: Honorio Pruitt MD  Deysi 10, 2019  3:14 PM

## 2019-06-10 NOTE — H&P
[facility] Surgery Consultation    CC:  Anemia/hemorrhoids     HPI:  This 39 year old year old female is seen at the request of Evangelina Ma for evaluation of anemia/hemorrhoids.  The history is obtained from the patient, and reviewing the medical record.  She is good medical historian. She states that she has been having problems with pain with bowel movements and bleeding. She has a history of constipation where she will have a bowel movement every 2-3 days. She feels she has to bear down to have a bowel movement. She has had bright red blood while wiping and in the toilet. She has never tried any over the counter medications for her constipation. She has tried preparation H, tucks wipes when symptomatic. Over the past couple of months the bleeding has become worse along with the pain. She has no denise-anal pruritus, or seepage. She was seen in the ER for a cough where she was found to be anemic. She does endorse shortness of breath which she usually hasn't been. She was sent to surgery for further evaluation.    She tolerated the prep well with clear results. No change in her medical history since our last visit.    Past Medical History:   Diagnosis Date     Obesity      Spondylolisthesis of lumbar region     Grade 2/4     Tobacco use disorder 2013       Past Surgical History:   Procedure Laterality Date     GYN SURGERY           HC HYSTEROS W PERMANENT FALLOPAIN IMPLANT         Family History   Problem Relation Age of Onset     Heart Disease Father 53        MI and bypass surgery     Diabetes Father      Hypertension Father        Social History     Tobacco Use     Smoking status: Former Smoker     Packs/day: 0.50     Start date: 2015     Smokeless tobacco: Never Used   Substance Use Topics     Alcohol use: No     Drug use: No       Prior to Admission medications    Medication Sig Start Date End Date Taking? Authorizing Provider   ferrous sulfate (FEROSUL) 325 (65 Fe) MG tablet Take 1 tablet  (325 mg) by mouth 3 times daily 5/7/19  Yes Emily Pinto, NP   lidocaine (XYLOCAINE) 2 % external gel Apply topically 3 times daily as needed for moderate pain Apply to anus three times daily as needed 5/9/19  Yes Juan Carmona MD   Na Sulfate-K Sulfate-Mg Sulf (SUPREP BOWEL PREP KIT) solution Drink 1 bottle 6PM prior to procedure followed by 2 16 oz glasses water over next hour. Repeat with 2nd bottle 6 hours prior to procedure 5/9/19  Yes Juan Carmona MD   nifedipine 0.2% in white petrolatum 0.2 % OINT ointment Apply topically 2 times daily Apply to anus twice daily for 4 weeks 5/9/19  Yes Juan Carmona MD   cephALEXin (KEFLEX) 500 MG capsule Take 1 capsule (500 mg) by mouth 2 times daily for 5 days 5/10/19 5/15/19  Evangelina Ma, NP       Pt denied problems with bleeding or anesthesia  No mood altering drug use.       Allergies   Allergen Reactions     Bee Venom Anaphylaxis     Amoxicillin Hives       REVIEW OF SYSTEMS:  Ten point review of systems negative except those mentioned in the HPI.     The patient denies sleep apnea, latex allergies or MRSA    OBJECTIVE:    LMP 06/07/2019     GENERAL: Generally appears well, in no distress with appropriate affect.  HEENT:   Sclerae anicteric - No cervical, supra/infraclavicular lymphadenopathy  Respiratory:  Lungs clear to ausculation bilaterally with good air excursion  Cardiovascular:  Regular Rate and Rhythm with no murmurs gallops or rubs, normal   Psych:  Alert, oriented, affect appropriate with normal decision making ability.      IMPRESSION:  38 yo female with anemia/hemorrhoids    PLAN:  Ok to proceed with colonoscopy        Juan Carmona MD    6/10/2019  12:56 PM

## 2019-06-12 LAB — COPATH REPORT: NORMAL

## 2020-03-02 ENCOUNTER — HEALTH MAINTENANCE LETTER (OUTPATIENT)
Age: 40
End: 2020-03-02

## 2020-12-20 ENCOUNTER — HEALTH MAINTENANCE LETTER (OUTPATIENT)
Age: 40
End: 2020-12-20

## 2021-01-05 ENCOUNTER — TRANSFERRED RECORDS (OUTPATIENT)
Dept: HEALTH INFORMATION MANAGEMENT | Facility: HOSPITAL | Age: 41
End: 2021-01-05

## 2021-01-13 ENCOUNTER — TRANSFERRED RECORDS (OUTPATIENT)
Dept: HEALTH INFORMATION MANAGEMENT | Facility: CLINIC | Age: 41
End: 2021-01-13

## 2021-01-20 ENCOUNTER — TRANSFERRED RECORDS (OUTPATIENT)
Dept: HEALTH INFORMATION MANAGEMENT | Facility: CLINIC | Age: 41
End: 2021-01-20

## 2021-01-26 ENCOUNTER — TRANSFERRED RECORDS (OUTPATIENT)
Dept: HEALTH INFORMATION MANAGEMENT | Facility: CLINIC | Age: 41
End: 2021-01-26

## 2021-02-02 ENCOUNTER — TRANSFERRED RECORDS (OUTPATIENT)
Dept: HEALTH INFORMATION MANAGEMENT | Facility: CLINIC | Age: 41
End: 2021-02-02

## 2021-02-17 ENCOUNTER — TRANSFERRED RECORDS (OUTPATIENT)
Dept: HEALTH INFORMATION MANAGEMENT | Facility: CLINIC | Age: 41
End: 2021-02-17

## 2021-03-03 ENCOUNTER — TRANSFERRED RECORDS (OUTPATIENT)
Dept: HEALTH INFORMATION MANAGEMENT | Facility: CLINIC | Age: 41
End: 2021-03-03

## 2021-03-30 ENCOUNTER — TRANSFERRED RECORDS (OUTPATIENT)
Dept: HEALTH INFORMATION MANAGEMENT | Facility: CLINIC | Age: 41
End: 2021-03-30

## 2021-04-09 ENCOUNTER — TRANSFERRED RECORDS (OUTPATIENT)
Dept: HEALTH INFORMATION MANAGEMENT | Facility: CLINIC | Age: 41
End: 2021-04-09

## 2021-04-18 ENCOUNTER — HEALTH MAINTENANCE LETTER (OUTPATIENT)
Age: 41
End: 2021-04-18

## 2021-04-21 ENCOUNTER — TRANSFERRED RECORDS (OUTPATIENT)
Dept: HEALTH INFORMATION MANAGEMENT | Facility: CLINIC | Age: 41
End: 2021-04-21

## 2021-05-14 ENCOUNTER — TRANSFERRED RECORDS (OUTPATIENT)
Dept: HEALTH INFORMATION MANAGEMENT | Facility: CLINIC | Age: 41
End: 2021-05-14

## 2021-05-24 ENCOUNTER — HOSPITAL ENCOUNTER (EMERGENCY)
Facility: HOSPITAL | Age: 41
Discharge: HOME | End: 2021-05-24
Attending: PHYSICIAN ASSISTANT | Admitting: PHYSICIAN ASSISTANT
Payer: COMMERCIAL

## 2021-05-24 VITALS
HEART RATE: 78 BPM | OXYGEN SATURATION: 100 % | TEMPERATURE: 99 F | SYSTOLIC BLOOD PRESSURE: 131 MMHG | DIASTOLIC BLOOD PRESSURE: 71 MMHG | RESPIRATION RATE: 18 BRPM

## 2021-05-24 DIAGNOSIS — J20.9 ACUTE BRONCHITIS: ICD-10-CM

## 2021-05-24 DIAGNOSIS — R05.9 COUGH: ICD-10-CM

## 2021-05-24 LAB
LABORATORY COMMENT REPORT: NORMAL
SARS-COV-2 RNA RESP QL NAA+PROBE: NEGATIVE
SPECIMEN SOURCE: NORMAL

## 2021-05-24 PROCEDURE — 99213 OFFICE O/P EST LOW 20 MIN: CPT | Performed by: PHYSICIAN ASSISTANT

## 2021-05-24 PROCEDURE — C9803 HOPD COVID-19 SPEC COLLECT: HCPCS

## 2021-05-24 PROCEDURE — U0005 INFEC AGEN DETEC AMPLI PROBE: HCPCS | Performed by: PHYSICIAN ASSISTANT

## 2021-05-24 PROCEDURE — G0463 HOSPITAL OUTPT CLINIC VISIT: HCPCS

## 2021-05-24 PROCEDURE — U0003 INFECTIOUS AGENT DETECTION BY NUCLEIC ACID (DNA OR RNA); SEVERE ACUTE RESPIRATORY SYNDROME CORONAVIRUS 2 (SARS-COV-2) (CORONAVIRUS DISEASE [COVID-19]), AMPLIFIED PROBE TECHNIQUE, MAKING USE OF HIGH THROUGHPUT TECHNOLOGIES AS DESCRIBED BY CMS-2020-01-R: HCPCS | Performed by: PHYSICIAN ASSISTANT

## 2021-05-24 ASSESSMENT — ENCOUNTER SYMPTOMS
NAUSEA: 0
FEVER: 0
WHEEZING: 0
APPETITE CHANGE: 0
CHILLS: 0
CHEST TIGHTNESS: 0
SINUS PAIN: 0
ACTIVITY CHANGE: 0
COUGH: 1
SHORTNESS OF BREATH: 0
PALPITATIONS: 0
BRUISES/BLEEDS EASILY: 0
ABDOMINAL PAIN: 0
SINUS PRESSURE: 0
VOMITING: 0
FATIGUE: 0
SORE THROAT: 0

## 2021-05-24 NOTE — LETTER
Riri Oro was seen and treated in our emergency department on 5/24/2021.  Please excuse her from work on 5/24, 5/25, and 5/26 due to illness.       If you have any questions or concerns, please don't hesitate to call.                Jo Burr PA-C

## 2021-05-25 NOTE — DISCHARGE INSTRUCTIONS
You have elected to forego further work-up for your cough.  This is reasonable.  However, I would like you to return here for any worsening or new symptoms.  If the symptoms persist for more than a week please follow-up in the clinic.

## 2021-05-25 NOTE — ED TRIAGE NOTES
Pt is here with cough and chest congestion reports the cough is non productive just had second covid vaccine last friday   Was told by nurse triage line to come in a get checked you   States has at home covid test she can do

## 2021-05-25 NOTE — ED PROVIDER NOTES
History     Chief Complaint   Patient presents with     Chest Congestion     The history is provided by the patient.     Riri Oro is a 41 year old female who presented to the urgent care ambulatory for evaluation of a 48-hour history of cough.  The patient received her second Covid vaccine on Friday and developed the usual systemic symptoms of body aches and fever.  The symptoms have resolved however she has also developed a nonproductive harsh cough.  Denies any dyspnea.  Denies any chest pain.  Denies any wheezing.  Denies any headache.  Denies any current fevers or chills.  Denies any hemoptysis.  Denies any sore throat.  Reports that she gets similar symptoms every spring.  She does not smoke.  She has no history of VTE or unilateral leg swelling.  She has no orthopnea or PND.    Allergies:  Allergies   Allergen Reactions     Bee Venom Anaphylaxis     Amoxicillin Hives       Problem List:    Patient Active Problem List    Diagnosis Date Noted     Spondylolisthesis of lumbar region      Priority: Medium     Grade 2/4       Obesity      Priority: Medium     Tobacco use disorder 2013     Priority: Medium        Past Medical History:    Past Medical History:   Diagnosis Date     Obesity      Spondylolisthesis of lumbar region      Tobacco use disorder 2013       Past Surgical History:    Past Surgical History:   Procedure Laterality Date     COLONOSCOPY N/A 6/10/2019    Procedure: COLONOSCOPY WITH POLYPECTOMY;  Surgeon: Juan Carmona MD;  Location: [facility]     GYN SURGERY           HC HYSTEROS W PERMANENT FALLOPAIN IMPLANT         Family History:    Family History   Problem Relation Age of Onset     Heart Disease Father 53        MI and bypass surgery     Diabetes Father      Hypertension Father        Social History:  Marital Status:  Legally  [3]  Social History     Tobacco Use     Smoking status: Former Smoker     Packs/day: 0.50     Start date: 2015     Smokeless  tobacco: Never Used   Substance Use Topics     Alcohol use: No     Drug use: No        Medications:    No current outpatient medications on file.        Review of Systems   Constitutional: Negative for activity change, appetite change, chills, fatigue and fever.   HENT: Positive for congestion. Negative for sinus pressure, sinus pain and sore throat.    Respiratory: Positive for cough. Negative for chest tightness, shortness of breath and wheezing.    Cardiovascular: Negative for chest pain and palpitations.   Gastrointestinal: Negative for abdominal pain, nausea and vomiting.   Skin: Negative.    Allergic/Immunologic: Negative for immunocompromised state.   Hematological: Does not bruise/bleed easily.       Physical Exam   BP: 131/71  Pulse: 78  Temp: 99  F (37.2  C)  Resp: 18  SpO2: 100 %      Physical Exam  Vitals signs and nursing note reviewed.   Constitutional:       General: She is not in acute distress.     Appearance: Normal appearance. She is not ill-appearing, toxic-appearing or diaphoretic.      Comments: Very pleasant and talkative 41-year-old female found seated upright on the exam bed in no distress.   HENT:      Right Ear: Tympanic membrane, ear canal and external ear normal.      Left Ear: Tympanic membrane, ear canal and external ear normal.      Nose: Nose normal.   Eyes:      Extraocular Movements: Extraocular movements intact.      Conjunctiva/sclera: Conjunctivae normal.   Neck:      Musculoskeletal: Normal range of motion and neck supple. No neck rigidity.   Cardiovascular:      Rate and Rhythm: Normal rate and regular rhythm.      Pulses: Normal pulses.   Pulmonary:      Effort: Pulmonary effort is normal. No respiratory distress.      Breath sounds: Normal breath sounds. No stridor. No wheezing.      Comments: The patient has a harsh nonproductive cough in the exam room however has no evidence of wheezing, stridor, rales, respiratory distress, tachypnea, or other concerns.  Skin:      General: Skin is warm and dry.      Capillary Refill: Capillary refill takes less than 2 seconds.   Neurological:      General: No focal deficit present.      Mental Status: She is alert and oriented to person, place, and time.   Psychiatric:         Mood and Affect: Mood normal.         ED Course        Procedures           PERC Rule for risk stratifying PE to low risk (calculator)  Background  Risk stratifies patients to low risk of PE if all 8 criteria are present including age <50, heart rate <100, O2 Sat >94%, no unilateral leg edema, no hemoptysis, no recent surgery or trauma, no prior VTE, and no hormone use.  Data  41 year old  has Tobacco use disorder; Obesity; and Spondylolisthesis of lumbar region on their problem list.  @[email or web address]@   has a past surgical history that includes GYN surgery; HYSTEROS W PERMANENT FALLOPAIN IMPLANT; and Colonoscopy (N/A, 6/10/2019).  Pulse: 78  SpO2: 100 %  Criteria   Of  8 possible items (all criteria must be present):  NEGATIVE  Age <50 years  Heart rate <100 bpm  Oxygen Saturation >94%  No unilateral leg swelling  No hemoptysis  No surgery or trauma within 4 weeks  No prior DVT or PE  No hormone use (oral, transdermal and intravaginal estrogens)  Interpretation  All eight criteria are met AND low clinical PE suspicion: No further evaluation for PE required            Critical Care time:  none               No results found for this or any previous visit (from the past 24 hour(s)).    Medications - No data to display    Assessments & Plan (with Medical Decision Making)   Riri and I had a long detailed discussion.  She reports that she is only in the urgent care because the triage nurse from [facility] advised that she has to come in.  I discussed further evaluation including chest x-ray and laboratory evaluation.  The patient however declined.  She reports that she does not believe that she requires any those, and this is certainly reasonable.  She looks well.  She has normal vital  signs.  No tachycardia.  Oxygen saturation is 100% on room air.  No cough production.  She will be tested for COVID-19 and discharged.  She was advised close clinic follow-up for persistent symptoms.  She will return here for any worsening symptoms, new symptoms, or other concerns.    This document was prepared using a combination of typing and voice generated software.  While every attempt was made for accuracy, spelling and grammatical errors may exist.    I have reviewed the nursing notes.    I have reviewed the findings, diagnosis, plan and need for follow up with the patient.       New Prescriptions    No medications on file       Final diagnoses:   Acute bronchitis       5/24/2021   [facility]     Jo Burr PA-C  05/24/21 [id number]

## 2021-06-01 ENCOUNTER — TRANSFERRED RECORDS (OUTPATIENT)
Dept: HEALTH INFORMATION MANAGEMENT | Facility: CLINIC | Age: 41
End: 2021-06-01

## 2021-10-03 ENCOUNTER — HEALTH MAINTENANCE LETTER (OUTPATIENT)
Age: 41
End: 2021-10-03

## 2021-10-11 ENCOUNTER — TRANSFERRED RECORDS (OUTPATIENT)
Dept: HEALTH INFORMATION MANAGEMENT | Facility: CLINIC | Age: 41
End: 2021-10-11

## 2021-12-15 ENCOUNTER — OFFICE VISIT (OUTPATIENT)
Dept: FAMILY MEDICINE | Facility: OTHER | Age: 41
End: 2021-12-15
Attending: NURSE PRACTITIONER
Payer: COMMERCIAL

## 2021-12-15 ENCOUNTER — TELEPHONE (OUTPATIENT)
Dept: FAMILY MEDICINE | Facility: OTHER | Age: 41
End: 2021-12-15

## 2021-12-15 VITALS
WEIGHT: 179.2 LBS | DIASTOLIC BLOOD PRESSURE: 72 MMHG | OXYGEN SATURATION: 99 % | HEIGHT: 63 IN | HEART RATE: 68 BPM | SYSTOLIC BLOOD PRESSURE: 102 MMHG | BODY MASS INDEX: 31.75 KG/M2

## 2021-12-15 DIAGNOSIS — Z00.00 HEALTH MAINTENANCE EXAMINATION: Primary | ICD-10-CM

## 2021-12-15 DIAGNOSIS — Z13.1 SCREENING FOR DIABETES MELLITUS: ICD-10-CM

## 2021-12-15 DIAGNOSIS — Z12.31 ENCOUNTER FOR SCREENING MAMMOGRAM FOR BREAST CANCER: ICD-10-CM

## 2021-12-15 DIAGNOSIS — Z13.220 LIPID SCREENING: ICD-10-CM

## 2021-12-15 DIAGNOSIS — Z13.0 SCREENING, ANEMIA, DEFICIENCY, IRON: ICD-10-CM

## 2021-12-15 DIAGNOSIS — Z12.4 SCREENING FOR CERVICAL CANCER: ICD-10-CM

## 2021-12-15 DIAGNOSIS — D64.9 ANEMIA, UNSPECIFIED TYPE: ICD-10-CM

## 2021-12-15 LAB
ANION GAP SERPL CALCULATED.3IONS-SCNC: 5 MMOL/L (ref 3–14)
BASOPHILS # BLD AUTO: 0.1 10E3/UL (ref 0–0.2)
BASOPHILS NFR BLD AUTO: 1 %
BUN SERPL-MCNC: 9 MG/DL (ref 7–30)
CALCIUM SERPL-MCNC: 8.8 MG/DL (ref 8.5–10.1)
CHLORIDE BLD-SCNC: 106 MMOL/L (ref 94–109)
CHOLEST SERPL-MCNC: 195 MG/DL
CO2 SERPL-SCNC: 28 MMOL/L (ref 20–32)
CREAT SERPL-MCNC: 0.72 MG/DL (ref 0.52–1.04)
EOSINOPHIL # BLD AUTO: 0.2 10E3/UL (ref 0–0.7)
EOSINOPHIL NFR BLD AUTO: 3 %
ERYTHROCYTE [DISTWIDTH] IN BLOOD BY AUTOMATED COUNT: 17.3 % (ref 10–15)
FASTING STATUS PATIENT QL REPORTED: YES
FERRITIN SERPL-MCNC: 3 NG/ML (ref 12–150)
GFR SERPL CREATININE-BSD FRML MDRD: >90 ML/MIN/1.73M2
GLUCOSE BLD-MCNC: 95 MG/DL (ref 70–99)
HCT VFR BLD AUTO: 35.5 % (ref 35–47)
HDLC SERPL-MCNC: 56 MG/DL
HGB BLD-MCNC: 10.9 G/DL (ref 11.7–15.7)
IMM GRANULOCYTES # BLD: 0 10E3/UL
IMM GRANULOCYTES NFR BLD: 0 %
IRON SATN MFR SERPL: 6 % (ref 15–46)
IRON SERPL-MCNC: 22 UG/DL (ref 35–180)
LDLC SERPL CALC-MCNC: 125 MG/DL
LYMPHOCYTES # BLD AUTO: 2.2 10E3/UL (ref 0.8–5.3)
LYMPHOCYTES NFR BLD AUTO: 30 %
MCH RBC QN AUTO: 23.5 PG (ref 26.5–33)
MCHC RBC AUTO-ENTMCNC: 30.7 G/DL (ref 31.5–36.5)
MCV RBC AUTO: 77 FL (ref 78–100)
MONOCYTES # BLD AUTO: 0.5 10E3/UL (ref 0–1.3)
MONOCYTES NFR BLD AUTO: 7 %
NEUTROPHILS # BLD AUTO: 4.3 10E3/UL (ref 1.6–8.3)
NEUTROPHILS NFR BLD AUTO: 59 %
NONHDLC SERPL-MCNC: 139 MG/DL
NRBC # BLD AUTO: 0 10E3/UL
NRBC BLD AUTO-RTO: 0 /100
PLATELET # BLD AUTO: 381 10E3/UL (ref 150–450)
POTASSIUM BLD-SCNC: 3.5 MMOL/L (ref 3.4–5.3)
RBC # BLD AUTO: 4.63 10E6/UL (ref 3.8–5.2)
SODIUM SERPL-SCNC: 139 MMOL/L (ref 133–144)
TIBC SERPL-MCNC: 394 UG/DL (ref 240–430)
TRIGL SERPL-MCNC: 70 MG/DL
WBC # BLD AUTO: 7.3 10E3/UL (ref 4–11)

## 2021-12-15 PROCEDURE — 87624 HPV HI-RISK TYP POOLED RSLT: CPT | Performed by: NURSE PRACTITIONER

## 2021-12-15 PROCEDURE — 36415 COLL VENOUS BLD VENIPUNCTURE: CPT | Performed by: NURSE PRACTITIONER

## 2021-12-15 PROCEDURE — 82607 VITAMIN B-12: CPT | Performed by: NURSE PRACTITIONER

## 2021-12-15 PROCEDURE — G0145 SCR C/V CYTO,THINLAYER,RESCR: HCPCS | Performed by: NURSE PRACTITIONER

## 2021-12-15 PROCEDURE — 83550 IRON BINDING TEST: CPT | Performed by: NURSE PRACTITIONER

## 2021-12-15 PROCEDURE — 85025 COMPLETE CBC W/AUTO DIFF WBC: CPT | Performed by: NURSE PRACTITIONER

## 2021-12-15 PROCEDURE — 82728 ASSAY OF FERRITIN: CPT | Performed by: NURSE PRACTITIONER

## 2021-12-15 PROCEDURE — 80048 BASIC METABOLIC PNL TOTAL CA: CPT | Performed by: NURSE PRACTITIONER

## 2021-12-15 PROCEDURE — 82746 ASSAY OF FOLIC ACID SERUM: CPT | Performed by: NURSE PRACTITIONER

## 2021-12-15 PROCEDURE — 80061 LIPID PANEL: CPT | Performed by: NURSE PRACTITIONER

## 2021-12-15 PROCEDURE — 99396 PREV VISIT EST AGE 40-64: CPT | Performed by: NURSE PRACTITIONER

## 2021-12-15 ASSESSMENT — ANXIETY QUESTIONNAIRES
7. FEELING AFRAID AS IF SOMETHING AWFUL MIGHT HAPPEN: NOT AT ALL
5. BEING SO RESTLESS THAT IT IS HARD TO SIT STILL: NOT AT ALL
IF YOU CHECKED OFF ANY PROBLEMS ON THIS QUESTIONNAIRE, HOW DIFFICULT HAVE THESE PROBLEMS MADE IT FOR YOU TO DO YOUR WORK, TAKE CARE OF THINGS AT HOME, OR GET ALONG WITH OTHER PEOPLE: NOT DIFFICULT AT ALL
3. WORRYING TOO MUCH ABOUT DIFFERENT THINGS: NOT AT ALL
2. NOT BEING ABLE TO STOP OR CONTROL WORRYING: NOT AT ALL
6. BECOMING EASILY ANNOYED OR IRRITABLE: SEVERAL DAYS
1. FEELING NERVOUS, ANXIOUS, OR ON EDGE: NOT AT ALL
4. TROUBLE RELAXING: NOT AT ALL
GAD7 TOTAL SCORE: 1

## 2021-12-15 ASSESSMENT — MIFFLIN-ST. JEOR: SCORE: 1436.98

## 2021-12-15 ASSESSMENT — PAIN SCALES - GENERAL: PAINLEVEL: NO PAIN (0)

## 2021-12-15 NOTE — NURSING NOTE
"Chief Complaint   Patient presents with     Physical     complete physical        Initial /72 (BP Location: Left arm, Patient Position: Chair, Cuff Size: Adult Regular)   Pulse 68   Ht 1.6 m (5' 3\")   Wt 81.3 kg (179 lb 3.2 oz)   LMP 12/02/2021   SpO2 99%   BMI 31.74 kg/m   Estimated body mass index is 31.74 kg/m  as calculated from the following:    Height as of this encounter: 1.6 m (5' 3\").    Weight as of this encounter: 81.3 kg (179 lb 3.2 oz).  Medication Reconciliation: complete  Emerita Oro LPN  "

## 2021-12-15 NOTE — PROGRESS NOTES
"   SUBJECTIVE:   CC: Riri Oro is an 41 year old woman who presents for preventive health visit.       Patient has been advised of split billing requirements and indicates understanding: Yes  HPI     Ability to successfully perform activities of daily living: Yes, no assistance needed  Home safety:  none identified   Diet: does weight watchers, has been trying to lose weight, stays away from a lot of sugar and bread, eats veggies with chicken or beef   Calcium Intake: \"probably not\"  Fluids: over 64 oz a day   Weight: has lost 41 lbs this last year through diet and exercise   Exercise: usually likes to walk outside about 5 miles a day   Caffeine: 2-3 cups of coffee a day   Alcohol: never   Smoking: never   Dental: yearly   Vision: not yearly, does not wear glasses or corrective lenses   Hearing impairment: no   Periods: regular   Break through bleeding: never   Sexually active: yes   Number of partners: one,    Birthcontrol: no   Protection: no     Today's PHQ-2 Score:   PHQ-2 ( 1999 Pfizer) 5/8/2019   Q1: Little interest or pleasure in doing things 0   Q2: Feeling down, depressed or hopeless 0   PHQ-2 Score 0   PHQ-2 Total Score (12-17 Years)- Positive if 3 or more points; Administer PHQ-A if positive 0       Abuse: Current or Past (Physical, Sexual or Emotional) - No  Do you feel safe in your environment? Yes    Have you ever done Advance Care Planning? (For example, a Health Directive, POLST, or a discussion with a medical provider or your loved ones about your wishes): No, advance care planning information given to patient to review.  Patient plans to discuss their wishes with loved ones or provider.      Social History     Tobacco Use     Smoking status: Former Smoker     Packs/day: 0.50     Start date: 1/9/2015     Smokeless tobacco: Never Used   Substance Use Topics     Alcohol use: No       Reviewed orders with patient.  Reviewed health maintenance and updated orders accordingly - Yes    Breast " Cancer Screening:  Any new diagnosis of family breast, ovarian, or bowel cancer? Yes- both grandmothers have history of breast cancer and grandfather had history of leukemia     FHS-7:   Breast CA Risk Assessment (FHS-7) 12/15/2021 12/15/2021   Did any of your first-degree relatives have breast or ovarian cancer? No No   Did any of your relatives have bilateral breast cancer? No No   Did any man in your family have breast cancer? No No   Did any woman in your family have breast and ovarian cancer? Yes Yes   Did any woman in your family have breast cancer before age 50 y? Unknown Unknown   Do you have 2 or more relatives with breast and/or ovarian cancer? Yes Yes   Do you have 2 or more relatives with breast and/or bowel cancer? Yes Yes     Mammogram Screening - Offered annual screening and updated Health Maintenance for mutual plan based on risk factor consideration. Patient will get to mammogram. Order was placed.     History of abnormal Pap smear: NO - age 30-65 PAP every 5 years with negative HPV co-testing recommended  PAP / HPV 2013   PAP (Historical) NIL        Past Medical History:   Diagnosis Date     Obesity      Spondylolisthesis of lumbar region     Grade 2/4     Tobacco use disorder 2013      Past Surgical History:   Procedure Laterality Date     COLONOSCOPY N/A 6/10/2019    Procedure: COLONOSCOPY WITH POLYPECTOMY;  Surgeon: Juan Carmona MD;  Location: [facility]     GYN SURGERY           HC HYSTEROS W PERMANENT FALLOPAIN IMPLANT       OB History   No obstetric history on file.       Review of Systems  CONSTITUTIONAL: NEGATIVE for fever, chills, change in weight  INTEGUMENTARU/SKIN: NEGATIVE for worrisome rashes, moles or lesions  EYES: NEGATIVE for vision changes or irritation  ENT: NEGATIVE for ear, mouth and throat problems  RESP: NEGATIVE for significant cough or SOB  BREAST: NEGATIVE for masses, tenderness or discharge  CV: NEGATIVE for chest pain, palpitations or peripheral  "edema  GI: NEGATIVE for nausea, abdominal pain, heartburn, or change in bowel habits  : NEGATIVE for unusual urinary or vaginal symptoms. Periods are regular.  MUSCULOSKELETAL: NEGATIVE for significant arthralgias or myalgia  NEURO: NEGATIVE for weakness, dizziness or paresthesias  PSYCHIATRIC: NEGATIVE for changes in mood or affect     OBJECTIVE:   /72 (BP Location: Left arm, Patient Position: Chair, Cuff Size: Adult Regular)   Pulse 68   Ht 1.6 m (5' 3\")   Wt 81.3 kg (179 lb 3.2 oz)   LMP 12/02/2021   SpO2 99%   BMI 31.74 kg/m    Physical Exam  GENERAL: healthy, alert and no distress  EYES: Eyes grossly normal to inspection, PERRL and conjunctivae and sclerae normal  HENT: ear canals and TM's normal, nose and mouth without ulcers or lesions  NECK: no adenopathy, no asymmetry, masses, or scars and thyroid normal to palpation  RESP: lungs clear to auscultation - no rales, rhonchi or wheezes  BREAST: normal without masses, tenderness or nipple discharge and no palpable axillary masses or adenopathy  CV: regular rate and rhythm, normal S1 S2, no S3 or S4, no murmur, click or rub, no peripheral edema and peripheral pulses strong  ABDOMEN: soft, nontender, no hepatosplenomegaly, no masses and bowel sounds normal   (female): normal female external genitalia, normal urethral meatus, vaginal mucosa pink, moist, well rugated, and normal cervix/adnexa/uterus without masses or discharge  MS: no gross musculoskeletal defects noted, no edema  SKIN: no suspicious lesions or rashes  NEURO: Normal strength and tone, mentation intact and speech normal  PSYCH: mentation appears normal, affect normal/bright    Diagnostic Test Results:  Labs reviewed in Epic  Results for orders placed or performed in visit on 12/15/21 (from the past 24 hour(s))   Lipid Profile (Chol, Trig, HDL, LDL calc)   Result Value Ref Range    Cholesterol 195 <200 mg/dL    Triglycerides 70 <150 mg/dL    Direct Measure HDL 56 >=50 mg/dL    LDL " Cholesterol Calculated 125 (H) <=100 mg/dL    Non HDL Cholesterol 139 (H) <130 mg/dL    Patient Fasting > 8hrs? Yes     Narrative    Cholesterol  Desirable:  <200 mg/dL    Triglycerides  Normal:  Less than 150 mg/dL  Borderline High:  150-199 mg/dL  High:  200-499 mg/dL  Very High:  Greater than or equal to 500 mg/dL    Direct Measure HDL  Female:  Greater than or equal to 50 mg/dL   Male:  Greater than or equal to 40 mg/dL    LDL Cholesterol  Desirable:  <100mg/dL  Above Desirable:  100-129 mg/dL   Borderline High:  130-159 mg/dL   High:  160-189 mg/dL   Very High:  >= 190 mg/dL    Non HDL Cholesterol  Desirable:  130 mg/dL  Above Desirable:  130-159 mg/dL  Borderline High:  160-189 mg/dL  High:  190-219 mg/dL  Very High:  Greater than or equal to 220 mg/dL   Basic metabolic panel   Result Value Ref Range    Sodium 139 133 - 144 mmol/L    Potassium 3.5 3.4 - 5.3 mmol/L    Chloride 106 94 - 109 mmol/L    Carbon Dioxide (CO2) 28 20 - 32 mmol/L    Anion Gap 5 3 - 14 mmol/L    Urea Nitrogen 9 7 - 30 mg/dL    Creatinine 0.72 0.52 - 1.04 mg/dL    Calcium 8.8 8.5 - 10.1 mg/dL    Glucose 95 70 - 99 mg/dL    GFR Estimate >90 >60 mL/min/1.73m2   CBC with platelets and differential    Narrative    The following orders were created for panel order CBC with platelets and differential.  Procedure                               Abnormality         Status                     ---------                               -----------         ------                     CBC with platelets and d...[[id number]]  Abnormal            Final result                 Please view results for these tests on the individual orders.   CBC with platelets and differential   Result Value Ref Range    WBC Count 7.3 4.0 - 11.0 10e3/uL    RBC Count 4.63 3.80 - 5.20 10e6/uL    Hemoglobin 10.9 (L) 11.7 - 15.7 g/dL    Hematocrit 35.5 35.0 - 47.0 %    MCV 77 (L) 78 - 100 fL    MCH 23.5 (L) 26.5 - 33.0 pg    MCHC 30.7 (L) 31.5 - 36.5 g/dL    RDW 17.3 (H) 10.0 - 15.0  %    Platelet Count 381 150 - 450 10e3/uL    % Neutrophils 59 %    % Lymphocytes 30 %    % Monocytes 7 %    % Eosinophils 3 %    % Basophils 1 %    % Immature Granulocytes 0 %    NRBCs per 100 WBC 0 <1 /100    Absolute Neutrophils 4.3 1.6 - 8.3 10e3/uL    Absolute Lymphocytes 2.2 0.8 - 5.3 10e3/uL    Absolute Monocytes 0.5 0.0 - 1.3 10e3/uL    Absolute Eosinophils 0.2 0.0 - 0.7 10e3/uL    Absolute Basophils 0.1 0.0 - 0.2 10e3/uL    Absolute Immature Granulocytes 0.0 <=0.4 10e3/uL    Absolute NRBCs 0.0 10e3/uL     The 10-year ASCVD risk score (Leo CASTLE Jr., et al., 2013) is: 0.4%    Values used to calculate the score:      Age: 41 years      Sex: Female      Is Non- : No      Diabetic: No      Tobacco smoker: No      Systolic Blood Pressure: 102 mmHg      Is BP treated: No      HDL Cholesterol: 56 mg/dL      Total Cholesterol: 195 mg/dL  ASSESSMENT/PLAN:   (Z00.00) Health maintenance examination  (primary encounter diagnosis)  Plan: Overall Riri is a healthy 41 year old female. We updated her health maintenance and prevention as discussed in note. We reviewed the importance of annual physical exams and health maintenance.     (Z12.31) Encounter for screening mammogram for breast cancer  Plan: She does have a family history of both grandmothers with breat cancer. She has never had a mammogram. Discussed that guidelines recommend screening by age 40. She agreed to get this done.   - MA Diagnostic Bilateral w/Esdras    (Z13.220) Lipid screening  Plan: LDL is mildly elevated but her overall ASVD risk score is 0.4% and therefore no need for statin therapy.   - Lipid Profile (Chol, Trig, HDL, LDL calc)    (Z13.0) Screening, anemia, deficiency, iron  Anemia-HGB 10.9. Will have nursing staff call and have her return for folate, Vit b12, iron, ferritin, and peripheral smear. Will also collect IFOB. Will notify patient of the results when available and intervene accordingly.      (Z13.1) Screening for  "diabetes mellitus  Plan: Her BMP was normal. No need for further intervention.   - Basic metabolic panel    (Z12.4) Screening for cervical cancer  Plan: A pelvic exam and pap was performed in clinic today. Patient will be contacted with the results.   - A pap thin layer screen with  HPV - recommended age 30 - 65 years      Patient has been advised of split billing requirements and indicates understanding: Yes  COUNSELING:  Reviewed preventive health counseling, as reflected in patient instructions       Regular exercise       Healthy diet/nutrition       Vision screening       Immunizations    Declined: Influenza           Osteoporosis prevention/bone health    Estimated body mass index is 31.74 kg/m  as calculated from the following:    Height as of this encounter: 1.6 m (5' 3\").    Weight as of this encounter: 81.3 kg (179 lb 3.2 oz).      LUIGI Ch-S  [employer]     I was present with the nurse practitioner student who participated in the service and in the documentation of the note. I have verified the history and personally performed the physical exam and medical decision making. I agree with the assessment and plan of care as documented in the note.       Evangelina Ma NP  [facility] - [place]  "

## 2021-12-16 LAB
FOLATE SERPL-MCNC: 6.9 NG/ML
VIT B12 SERPL-MCNC: 380 PG/ML (ref 193–986)

## 2021-12-16 ASSESSMENT — ANXIETY QUESTIONNAIRES: GAD7 TOTAL SCORE: 1

## 2021-12-20 DIAGNOSIS — Z12.4 SCREENING FOR CERVICAL CANCER: Primary | ICD-10-CM

## 2021-12-20 LAB
BKR LAB AP GYN ADEQUACY: NORMAL
BKR LAB AP GYN INTERPRETATION: NORMAL
BKR LAB AP HPV REFLEX: NORMAL
BKR LAB AP LMP: NORMAL
BKR LAB AP PREVIOUS ABNORMAL: NORMAL
PATH REPORT.COMMENTS IMP SPEC: NORMAL
PATH REPORT.COMMENTS IMP SPEC: NORMAL
PATH REPORT.RELEVANT HX SPEC: NORMAL

## 2021-12-22 LAB
HUMAN PAPILLOMA VIRUS 16 DNA: NEGATIVE
HUMAN PAPILLOMA VIRUS 18 DNA: NEGATIVE
HUMAN PAPILLOMA VIRUS FINAL DIAGNOSIS: NORMAL
HUMAN PAPILLOMA VIRUS OTHER HR: NEGATIVE

## 2022-01-07 ENCOUNTER — LAB (OUTPATIENT)
Dept: LAB | Facility: OTHER | Age: 42
End: 2022-01-07
Payer: COMMERCIAL

## 2022-01-07 DIAGNOSIS — D64.9 ANEMIA, UNSPECIFIED TYPE: ICD-10-CM

## 2022-01-07 PROCEDURE — 85025 COMPLETE CBC W/AUTO DIFF WBC: CPT | Performed by: NURSE PRACTITIONER

## 2022-01-07 PROCEDURE — 36415 COLL VENOUS BLD VENIPUNCTURE: CPT | Performed by: NURSE PRACTITIONER

## 2022-01-07 PROCEDURE — 85045 AUTOMATED RETICULOCYTE COUNT: CPT | Performed by: NURSE PRACTITIONER

## 2022-01-10 ENCOUNTER — TELEPHONE (OUTPATIENT)
Dept: FAMILY MEDICINE | Facility: OTHER | Age: 42
End: 2022-01-10
Payer: COMMERCIAL

## 2022-01-10 NOTE — PROGRESS NOTES
"  Assessment & Plan     Hypochromic microcytic anemia  Rectal bleeding  Constipation  Patient with JESUS despite taking ferrous sulfate. Menses fairly light. She admits that she has hemorrhoids that often bleed. Has seen clots in her stool. Most likely the cause of her JESUS. Will refer to general surgery. For now, she will continue the ferrous sulfate. I also discussed ways to prevent constipation. She will start using a capful of MiraLax daily to help with constipation. Will continue eating fruits and veggies and getting plenty of water.     - CBC with platelets and differential; Future  - Iron and iron binding capacity; Future  - Adult General Surg Referral  - Iron and iron binding capacity  - CBC with platelets and differential      BMI:   Estimated body mass index is 31.71 kg/m  as calculated from the following:    Height as of this encounter: 1.6 m (5' 3\").    Weight as of this encounter: 81.2 kg (179 lb).       Evangelina Ma NP  [facility] - BAIRONELVIRA Menezes is a 41 year old who presents for the following health issues    HPI     Anemia:  Hemoglobin was 10.9 on 12/15/21 at complete physical. Has been chronically low. Folate and B12 low normal. Iron was low. Started on ferrous sulfate. Taking this once daily Peripheral smear in process.     She did have a colonoscopy in 2019. Bleeding hemorrhoiuds were seen. She has exteral hemorrhoids. Admits they are the size of a grape. Often constipated. She has tried lidopcaine without relief in her symptoms. Stools often hard. Admits that her bowel movements are irregular. Drinks a lot of water. Eating several fruits and veggies daily.     She does get her menses every 30 days, lasts for 5 days. Heavy for one day, fairly light the rest of the days.     She does have hemorrhoids. She notes that thes bleed approx 2 times per month. She does see blood in the tiolet when they bleed. She does have clots each time they bleed.     She notes that she is " "always fatigued. Occasional dizziness. No chest pain or shortness of breath.     Review of Systems   As noted in the HPI.       Objective    /64 (BP Location: Right arm, Patient Position: Chair, Cuff Size: Adult Large)   Pulse 62   Temp 98  F (36.7  C) (Tympanic)   Ht 1.6 m (5' 3\")   Wt 81.2 kg (179 lb)   LMP 01/03/2022   SpO2 98%   BMI 31.71 kg/m    Body mass index is 31.71 kg/m .  Physical Exam   GENERAL: healthy, alert and no distress  EYES: Eyes grossly normal to inspection, PERRL and conjunctivae and sclerae normal  HENT: ear canals and TM's normal, nose and mouth without ulcers or lesions  NECK: no adenopathy, no asymmetry, masses, or scars and thyroid normal to palpation  RESP: lungs clear to auscultation - no rales, rhonchi or wheezes  CV: regular rate and rhythm, normal S1 S2, no S3 or S4, no murmur, click or rub, no peripheral edema  ABDOMEN: soft, nontender, no hepatosplenomegaly, no masses and bowel sounds normal  NEURO: Normal strength and tone, mentation intact and speech normal  PSYCH: mentation appears normal, affect normal/bright    Results for orders placed or performed in visit on 01/11/22 (from the past 24 hour(s))   Ferritin   Result Value Ref Range    Ferritin 10 (L) 12 - 150 ng/mL   Iron and iron binding capacity   Result Value Ref Range    Iron 76 35 - 180 ug/dL    Iron Binding Capacity 363 240 - 430 ug/dL    Iron Sat Index 21 15 - 46 %   CBC with platelets and differential    Narrative    The following orders were created for panel order CBC with platelets and differential.  Procedure                               Abnormality         Status                     ---------                               -----------         ------                     CBC with platelets and d...[[id number]]  Abnormal            Final result                 Please view results for these tests on the individual orders.   CBC with platelets and differential   Result Value Ref Range    WBC Count 5.8 4.0 - " 11.0 10e3/uL    RBC Count 4.72 3.80 - 5.20 10e6/uL    Hemoglobin 11.4 (L) 11.7 - 15.7 g/dL    Hematocrit 38.0 35.0 - 47.0 %    MCV 81 78 - 100 fL    MCH 24.2 (L) 26.5 - 33.0 pg    MCHC 30.0 (L) 31.5 - 36.5 g/dL    RDW 17.0 (H) 10.0 - 15.0 %    Platelet Count 367 150 - 450 10e3/uL    % Neutrophils 63 %    % Lymphocytes 26 %    % Monocytes 8 %    % Eosinophils 2 %    % Basophils 1 %    % Immature Granulocytes 0 %    NRBCs per 100 WBC 0 <1 /100    Absolute Neutrophils 3.7 1.6 - 8.3 10e3/uL    Absolute Lymphocytes 1.5 0.8 - 5.3 10e3/uL    Absolute Monocytes 0.5 0.0 - 1.3 10e3/uL    Absolute Eosinophils 0.1 0.0 - 0.7 10e3/uL    Absolute Basophils 0.0 0.0 - 0.2 10e3/uL    Absolute Immature Granulocytes 0.0 <=0.4 10e3/uL    Absolute NRBCs 0.0 10e3/uL

## 2022-01-11 ENCOUNTER — OFFICE VISIT (OUTPATIENT)
Dept: FAMILY MEDICINE | Facility: OTHER | Age: 42
End: 2022-01-11
Attending: NURSE PRACTITIONER
Payer: COMMERCIAL

## 2022-01-11 VITALS
SYSTOLIC BLOOD PRESSURE: 102 MMHG | WEIGHT: 179 LBS | OXYGEN SATURATION: 98 % | HEIGHT: 63 IN | HEART RATE: 62 BPM | DIASTOLIC BLOOD PRESSURE: 64 MMHG | BODY MASS INDEX: 31.71 KG/M2 | TEMPERATURE: 98 F

## 2022-01-11 DIAGNOSIS — D50.9 HYPOCHROMIC MICROCYTIC ANEMIA: Primary | ICD-10-CM

## 2022-01-11 DIAGNOSIS — K62.5 RECTAL BLEEDING: ICD-10-CM

## 2022-01-11 DIAGNOSIS — K59.00 CONSTIPATION, UNSPECIFIED CONSTIPATION TYPE: ICD-10-CM

## 2022-01-11 LAB
BASOPHILS # BLD AUTO: 0 10E3/UL (ref 0–0.2)
BASOPHILS NFR BLD AUTO: 1 %
EOSINOPHIL # BLD AUTO: 0.1 10E3/UL (ref 0–0.7)
EOSINOPHIL NFR BLD AUTO: 2 %
ERYTHROCYTE [DISTWIDTH] IN BLOOD BY AUTOMATED COUNT: 17 % (ref 10–15)
FERRITIN SERPL-MCNC: 10 NG/ML (ref 12–150)
HCT VFR BLD AUTO: 38 % (ref 35–47)
HGB BLD-MCNC: 11.4 G/DL (ref 11.7–15.7)
IMM GRANULOCYTES # BLD: 0 10E3/UL
IMM GRANULOCYTES NFR BLD: 0 %
IRON SATN MFR SERPL: 21 % (ref 15–46)
IRON SERPL-MCNC: 76 UG/DL (ref 35–180)
LYMPHOCYTES # BLD AUTO: 1.5 10E3/UL (ref 0.8–5.3)
LYMPHOCYTES NFR BLD AUTO: 26 %
MCH RBC QN AUTO: 24.2 PG (ref 26.5–33)
MCHC RBC AUTO-ENTMCNC: 30 G/DL (ref 31.5–36.5)
MCV RBC AUTO: 81 FL (ref 78–100)
MONOCYTES # BLD AUTO: 0.5 10E3/UL (ref 0–1.3)
MONOCYTES NFR BLD AUTO: 8 %
NEUTROPHILS # BLD AUTO: 3.7 10E3/UL (ref 1.6–8.3)
NEUTROPHILS NFR BLD AUTO: 63 %
NRBC # BLD AUTO: 0 10E3/UL
NRBC BLD AUTO-RTO: 0 /100
PLATELET # BLD AUTO: 367 10E3/UL (ref 150–450)
RBC # BLD AUTO: 4.72 10E6/UL (ref 3.8–5.2)
TIBC SERPL-MCNC: 363 UG/DL (ref 240–430)
WBC # BLD AUTO: 5.8 10E3/UL (ref 4–11)

## 2022-01-11 PROCEDURE — 85025 COMPLETE CBC W/AUTO DIFF WBC: CPT | Performed by: NURSE PRACTITIONER

## 2022-01-11 PROCEDURE — 82728 ASSAY OF FERRITIN: CPT | Performed by: NURSE PRACTITIONER

## 2022-01-11 PROCEDURE — 99214 OFFICE O/P EST MOD 30 MIN: CPT | Performed by: NURSE PRACTITIONER

## 2022-01-11 PROCEDURE — 36415 COLL VENOUS BLD VENIPUNCTURE: CPT | Performed by: NURSE PRACTITIONER

## 2022-01-11 PROCEDURE — 83550 IRON BINDING TEST: CPT | Performed by: NURSE PRACTITIONER

## 2022-01-11 ASSESSMENT — MIFFLIN-ST. JEOR: SCORE: 1436.07

## 2022-01-11 ASSESSMENT — PAIN SCALES - GENERAL: PAINLEVEL: NO PAIN (0)

## 2022-01-11 NOTE — NURSING NOTE
"Chief Complaint   Patient presents with     Anemia     follow up        Initial /64 (BP Location: Right arm, Patient Position: Chair, Cuff Size: Adult Large)   Pulse 62   Temp 98  F (36.7  C) (Tympanic)   Ht 1.6 m (5' 3\")   Wt 81.2 kg (179 lb)   LMP 01/03/2022   SpO2 98%   BMI 31.71 kg/m   Estimated body mass index is 31.71 kg/m  as calculated from the following:    Height as of this encounter: 1.6 m (5' 3\").    Weight as of this encounter: 81.2 kg (179 lb).  Medication Reconciliation: complete  Emerita Oro LPN  "

## 2022-01-12 ENCOUNTER — TELEPHONE (OUTPATIENT)
Dept: SURGERY | Facility: OTHER | Age: 42
End: 2022-01-12
Payer: COMMERCIAL

## 2022-01-12 NOTE — TELEPHONE ENCOUNTER
Called patient for a colonscopy consult 01/12/22. Left voicemail to call back,                  Ana Maloney

## 2022-01-26 ENCOUNTER — OFFICE VISIT (OUTPATIENT)
Dept: SURGERY | Facility: OTHER | Age: 42
End: 2022-01-26
Attending: NURSE PRACTITIONER
Payer: COMMERCIAL

## 2022-01-26 VITALS
TEMPERATURE: 98.1 F | BODY MASS INDEX: 30.56 KG/M2 | HEART RATE: 58 BPM | OXYGEN SATURATION: 99 % | SYSTOLIC BLOOD PRESSURE: 110 MMHG | HEIGHT: 64 IN | DIASTOLIC BLOOD PRESSURE: 64 MMHG | WEIGHT: 179 LBS

## 2022-01-26 DIAGNOSIS — D50.9 HYPOCHROMIC MICROCYTIC ANEMIA: ICD-10-CM

## 2022-01-26 DIAGNOSIS — K62.5 RECTAL BLEEDING: ICD-10-CM

## 2022-01-26 PROCEDURE — 99203 OFFICE O/P NEW LOW 30 MIN: CPT | Performed by: SURGERY

## 2022-01-26 ASSESSMENT — MIFFLIN-ST. JEOR: SCORE: 1447.97

## 2022-01-26 ASSESSMENT — PAIN SCALES - GENERAL: PAINLEVEL: NO PAIN (0)

## 2022-01-26 NOTE — PROGRESS NOTES
CLINIC NOTE - CONSULT  2022    Patient:Riri Oro  Referring Physician:    Reason for Referral: Hemorrhoids    This is a 41 year old female with external hemorrhoids.   How long have they had hemorrhoids: several year(s)   Any prior history of hemorrhoidectomy: No   The patient complains of bleeding : Yes   The patient complains of pain : No   The patient complains of prolapse : Yes   The patient is able to reduce : Yes   The patient complains of leakage : No   The patient complains of the inability to keep the area clean : No   The patient has had a colonoscopy: Yes    If yes, then when : 2019    Past Medical History:  Past Medical History:   Diagnosis Date     Obesity      Spondylolisthesis of lumbar region     Grade 2/4     Tobacco use disorder 2013       Past Surgical History:  Past Surgical History:   Procedure Laterality Date     COLONOSCOPY N/A 6/10/2019    Procedure: COLONOSCOPY WITH POLYPECTOMY;  Surgeon: Juan Carmona MD;  Location: [facility]     GYN SURGERY           HC HYSTEROS W PERMANENT FALLOPAIN IMPLANT         Family History History:  Family History   Problem Relation Age of Onset     Heart Disease Father 53        MI and bypass surgery     Diabetes Father      Hypertension Father        History of Tobacco Use:  History   Smoking Status     Former Smoker     Packs/day: 0.50     Start date: 2015   Smokeless Tobacco     Never Used       Current Medications:  Current Outpatient Medications   Medication Sig Dispense Refill     ferrous fumarate 65 mg, Lum[place]. FE,-Vitamin C 125 mg (VITRON C)  MG TABS tablet          Allergies:  Allergies   Allergen Reactions     Bee Venom Anaphylaxis     Amoxicillin Hives       ROS:  Pertinent items are noted in HPI.  All other systems are negative.    PHYSICAL EXAM:     Vital signs: /64 (BP Location: Left arm, Patient Position: Chair, Cuff Size: Adult Regular)   Pulse 58   Temp 98.1  F (36.7  C) (Tympanic)   Ht 1.619 m (5'  "3.75\")   Wt 81.2 kg (179 lb)   LMP 01/03/2022   SpO2 99%   BMI 30.97 kg/m     Weight: [unfilled]   BMI: Body mass index is 30.97 kg/m .   General: Normal, healthy, cooperative, in no acute distress   Skin: no jaundice   HEENT: PERRLA   Neck: supple   Lungs: clear to auscultation   CV: Regular rate and rhythm without murmer   Abdominal: abdomen is soft without significant tenderness, masses, organomegaly or guarding   Extremities: No cyanosis, clubbing or edema noted bilaterally in Upper and Lower Extremities   Neurological: without deficit   Rectal: Normal sphincter tome and Reducible internal hemorrhoids present      ASSESSMENT: This is a 41 year old female with Internal and external hemorrhoids.    PLAN:     Hemorrhoids and hemorrhoidal care was discussed with the patient in depth. This includes surgical and non-surgical options.  The patient elects for non surgical options at this time.   WILL NOT prescribe lidocaine ointment and Anusol-HC for symptomatic relief.  Also discussed the use of sitz baths, baby wipes, Caldesene power and buttock pressure measures to help relieve the symptoms.  All of the patients questions were answered.  The patient agrees to the plan.  The patient WILL NOT be scheduled a follow-up appointment .  The patient will return if symptoms get worse.  Prescriptions were not  written.    "

## 2022-01-26 NOTE — NURSING NOTE
"Chief Complaint   Patient presents with     Consult     hypochromic microcytic anemia, rectal bleeding       Initial /64 (BP Location: Left arm, Patient Position: Chair, Cuff Size: Adult Regular)   Pulse 58   Temp 98.1  F (36.7  C) (Tympanic)   Ht 1.619 m (5' 3.75\")   Wt 81.2 kg (179 lb)   LMP 01/03/2022   SpO2 99%   BMI 30.97 kg/m   Estimated body mass index is 30.97 kg/m  as calculated from the following:    Height as of this encounter: 1.619 m (5' 3.75\").    Weight as of this encounter: 81.2 kg (179 lb).  Medication Reconciliation: complete  VIRAL SAHU LPN    "

## 2022-01-26 NOTE — PATIENT INSTRUCTIONS
Thank you for allowing Dr. Staples and our surgical team to participate in your care. Please call our health unit coordinator at [phone] with scheduling questions or the nurse at [phone] with any other questions or concerns.

## 2022-09-04 ENCOUNTER — HEALTH MAINTENANCE LETTER (OUTPATIENT)
Age: 42
End: 2022-09-04

## 2023-01-15 ENCOUNTER — HEALTH MAINTENANCE LETTER (OUTPATIENT)
Age: 43
End: 2023-01-15

## 2023-04-29 ENCOUNTER — HEALTH MAINTENANCE LETTER (OUTPATIENT)
Age: 43
End: 2023-04-29

## 2024-02-18 ENCOUNTER — HEALTH MAINTENANCE LETTER (OUTPATIENT)
Age: 44
End: 2024-02-18

## (undated) DEVICE — FORCEP-COLON BIOPSY LARGE W/NEEDLE 240CM

## (undated) DEVICE — TUBING-SUCTION 20FT

## (undated) DEVICE — CONNECTOR-ERBEFLO 2 PORT

## (undated) DEVICE — APPLICATOR-CHLORAPREP 26ML TINTED CHG 2%+ 70% IPA-SURGICAL

## (undated) DEVICE — CANISTER-SUCTION 2000CC

## (undated) DEVICE — SENSOR-OXISENSOR II ADULT

## (undated) DEVICE — IRRIGATION-H2O 1000ML

## (undated) RX ORDER — LIDOCAINE HYDROCHLORIDE 20 MG/ML
INJECTION, SOLUTION EPIDURAL; INFILTRATION; INTRACAUDAL; PERINEURAL
Status: DISPENSED
Start: 2019-06-10

## (undated) RX ORDER — PROPOFOL 10 MG/ML
INJECTION, EMULSION INTRAVENOUS
Status: DISPENSED
Start: 2019-06-10